# Patient Record
Sex: MALE | Race: WHITE | NOT HISPANIC OR LATINO | URBAN - METROPOLITAN AREA
[De-identification: names, ages, dates, MRNs, and addresses within clinical notes are randomized per-mention and may not be internally consistent; named-entity substitution may affect disease eponyms.]

---

## 2021-04-16 ENCOUNTER — OFFICE VISIT CONVERTED (OUTPATIENT)
Dept: FAMILY MEDICINE CLINIC | Facility: CLINIC | Age: 31
End: 2021-04-16
Attending: FAMILY MEDICINE

## 2021-04-30 ENCOUNTER — CONVERSION ENCOUNTER (OUTPATIENT)
Dept: FAMILY MEDICINE CLINIC | Facility: CLINIC | Age: 31
End: 2021-04-30

## 2021-04-30 ENCOUNTER — OFFICE VISIT CONVERTED (OUTPATIENT)
Dept: FAMILY MEDICINE CLINIC | Facility: CLINIC | Age: 31
End: 2021-04-30
Attending: FAMILY MEDICINE

## 2021-04-30 ENCOUNTER — HOSPITAL ENCOUNTER (OUTPATIENT)
Dept: FAMILY MEDICINE CLINIC | Facility: CLINIC | Age: 31
Discharge: HOME | End: 2021-04-30
Attending: FAMILY MEDICINE

## 2021-04-30 LAB
CHOLEST SERPL-MCNC: 201 MG/DL (ref 107–200)
CHOLEST/HDLC SERPL: 5.3 {RATIO} (ref 3–6)
HDLC SERPL-MCNC: 38 MG/DL (ref 40–60)
LDLC SERPL CALC-MCNC: 134 MG/DL (ref 70–100)
SARS-COV-2 RNA SPEC QL NAA+PROBE: NOT DETECTED
T4 FREE SERPL-MCNC: 0.9 NG/DL (ref 0.9–1.8)
TRIGL SERPL-MCNC: 144 MG/DL (ref 40–150)
TSH SERPL-ACNC: 0.81 M[IU]/L (ref 0.27–4.2)
VLDLC SERPL-MCNC: 29 MG/DL (ref 5–37)

## 2021-05-09 VITALS — OXYGEN SATURATION: 98 % | TEMPERATURE: 96.6 F | HEART RATE: 88 BPM

## 2021-05-09 VITALS
HEIGHT: 68 IN | WEIGHT: 197 LBS | TEMPERATURE: 97.5 F | SYSTOLIC BLOOD PRESSURE: 120 MMHG | HEART RATE: 83 BPM | DIASTOLIC BLOOD PRESSURE: 80 MMHG | OXYGEN SATURATION: 95 % | BODY MASS INDEX: 29.86 KG/M2

## 2021-08-30 ENCOUNTER — OFFICE VISIT (OUTPATIENT)
Dept: FAMILY MEDICINE CLINIC | Facility: CLINIC | Age: 31
End: 2021-08-30

## 2021-08-30 VITALS — HEART RATE: 78 BPM | TEMPERATURE: 97.7 F | OXYGEN SATURATION: 94 %

## 2021-08-30 DIAGNOSIS — R30.0 DYSURIA: ICD-10-CM

## 2021-08-30 DIAGNOSIS — R05.9 COUGH: Primary | ICD-10-CM

## 2021-08-30 DIAGNOSIS — R50.9 FEVER, UNSPECIFIED FEVER CAUSE: ICD-10-CM

## 2021-08-30 DIAGNOSIS — R10.9 FLANK PAIN: ICD-10-CM

## 2021-08-30 PROBLEM — F31.9 BIPOLAR DISORDER (HCC): Status: ACTIVE | Noted: 2021-08-30

## 2021-08-30 LAB
BILIRUB BLD-MCNC: NEGATIVE MG/DL
CLARITY, POC: CLEAR
COLOR UR: YELLOW
GLUCOSE UR STRIP-MCNC: NEGATIVE MG/DL
KETONES UR QL: NEGATIVE
LEUKOCYTE EST, POC: NEGATIVE
NITRITE UR-MCNC: NEGATIVE MG/ML
PH UR: 7.5 [PH] (ref 5–8)
PROT UR STRIP-MCNC: NEGATIVE MG/DL
RBC # UR STRIP: NEGATIVE /UL
SP GR UR: 1.01 (ref 1–1.03)
UROBILINOGEN UR QL: NORMAL

## 2021-08-30 PROCEDURE — 99213 OFFICE O/P EST LOW 20 MIN: CPT | Performed by: FAMILY MEDICINE

## 2021-08-30 PROCEDURE — U0003 INFECTIOUS AGENT DETECTION BY NUCLEIC ACID (DNA OR RNA); SEVERE ACUTE RESPIRATORY SYNDROME CORONAVIRUS 2 (SARS-COV-2) (CORONAVIRUS DISEASE [COVID-19]), AMPLIFIED PROBE TECHNIQUE, MAKING USE OF HIGH THROUGHPUT TECHNOLOGIES AS DESCRIBED BY CMS-2020-01-R: HCPCS | Performed by: FAMILY MEDICINE

## 2021-08-30 NOTE — PROGRESS NOTES
Chief Complaint    Fever (chills and nausea, started Saturday), Cough, and Headache    Subjective      Darci Kumar presents to [facility]     History of Present Illness    1.) ACUTE ILLNESS : Onset - 8.28.21. Patient reports subjective fevers, chills, nausea and a cough. He also reports intermittent headaches. He admits to bilateral flank pain - new onset - admits to discomfort with urination.    Objective      Vital Signs:     Pulse 78   Temp 97.7 °F (36.5 °C)   SpO2 94%       Physical Exam  Vitals reviewed.   Constitutional:       General: He is not in acute distress.     Appearance: Normal appearance. He is well-developed.   HENT:      Head: Normocephalic and atraumatic.      Right Ear: Hearing, tympanic membrane and external ear normal.      Left Ear: Hearing, tympanic membrane and external ear normal.      Nose: Nose normal.   Eyes:      General: Lids are normal.         Right eye: No discharge.         Left eye: No discharge.      Conjunctiva/sclera: Conjunctivae normal.   Cardiovascular:      Rate and Rhythm: Normal rate and regular rhythm.   Pulmonary:      Effort: Pulmonary effort is normal. No respiratory distress.      Breath sounds: No stridor. No wheezing, rhonchi or rales.   Abdominal:      General: There is no distension.   Musculoskeletal:         General: No swelling.      Cervical back: Neck supple.      Comments: Tender with palpation of bilateral flank    Skin:     Coloration: Skin is not jaundiced.      Findings: No erythema.   Neurological:      Mental Status: He is alert. Mental status is at baseline.   Psychiatric:         Mood and Affect: Mood and affect normal.         Thought Content: Thought content normal.     Assessment and Plan    Diagnoses and all orders for this visit:    1. Cough (Primary)  Comments:  1.) COVID testing as noted. Excused from work until COVID is resulted. Adequate fluids and rest.  Orders:  -      COVID-19,CEPHEID/KARI/BDMAX,COR/JEAN PAUL/PAD/OLYA IN-HOUSE(OR EMERGENT/ADD-ON),NP SWAB IN TRANSPORT MEDIA 3-4 HR TAT, RT-PCR - Swab, Nasopharynx    2. Fever, unspecified fever cause  Comments:  1.) Acetaminophen PRN.  Orders:  -     COVID-19,CEPHEID/KARI/BDMAX,COR/JEAN PAUL/PAD/OLYA IN-HOUSE(OR EMERGENT/ADD-ON),NP SWAB IN TRANSPORT MEDIA 3-4 HR TAT, RT-PCR - Swab, Nasopharynx    3. Flank pain  Comments:  1.) Patient was unable to provide urine - provided with specimen cup - will return. Will enter order at that time.  Orders:  -     Cancel: POCT urinalysis dipstick, automated  -     Cancel: Urine Culture - Urine, Urine, Clean Catch    4. Dysuria  Comments:  1.) See above.  Orders:  -     Cancel: POCT urinalysis dipstick, automated  -     Cancel: Urine Culture - Urine, Urine, Clean Catch    Follow Up     Return if symptoms worsen or fail to improve.     Patient was given instructions and counseling regarding his condition or for health maintenance advice. Please see specific information pulled into the AVS if appropriate.

## 2021-09-01 LAB — SARS-COV-2 RNA RESP QL NAA+PROBE: NOT DETECTED

## 2022-05-24 RX ORDER — CARIPRAZINE 3 MG/1
CAPSULE, GELATIN COATED ORAL
Qty: 90 CAPSULE | OUTPATIENT
Start: 2022-05-24

## 2022-06-18 ENCOUNTER — HOSPITAL ENCOUNTER (OUTPATIENT)
Facility: HOSPITAL | Age: 32
Setting detail: OBSERVATION
Discharge: HOME | End: 2022-06-21
Attending: EMERGENCY MEDICINE | Admitting: FAMILY MEDICINE

## 2022-06-18 ENCOUNTER — APPOINTMENT (OUTPATIENT)
Dept: GENERAL RADIOLOGY | Facility: HOSPITAL | Age: 32
End: 2022-06-18

## 2022-06-18 DIAGNOSIS — L03.90 MRSA CELLULITIS: ICD-10-CM

## 2022-06-18 DIAGNOSIS — F19.10 IV DRUG ABUSE: ICD-10-CM

## 2022-06-18 DIAGNOSIS — Z78.9 DECREASED ACTIVITIES OF DAILY LIVING (ADL): ICD-10-CM

## 2022-06-18 DIAGNOSIS — B95.62 MRSA CELLULITIS: ICD-10-CM

## 2022-06-18 DIAGNOSIS — R26.2 DIFFICULTY IN WALKING: ICD-10-CM

## 2022-06-18 DIAGNOSIS — L03.116 CELLULITIS AND ABSCESS OF LEFT LEG: ICD-10-CM

## 2022-06-18 DIAGNOSIS — L02.416 CELLULITIS AND ABSCESS OF LEFT LEG: ICD-10-CM

## 2022-06-18 DIAGNOSIS — A41.9 SEPSIS WITHOUT ACUTE ORGAN DYSFUNCTION, DUE TO UNSPECIFIED ORGANISM: Primary | ICD-10-CM

## 2022-06-18 LAB
ALBUMIN SERPL-MCNC: 3.9 G/DL (ref 3.5–5.2)
ALBUMIN/GLOB SERPL: 1.3 G/DL
ALP SERPL-CCNC: 87 U/L (ref 39–117)
ALT SERPL W P-5'-P-CCNC: 15 U/L (ref 1–41)
ANION GAP SERPL CALCULATED.3IONS-SCNC: 14.1 MMOL/L (ref 5–15)
AST SERPL-CCNC: 18 U/L (ref 1–40)
BASOPHILS # BLD AUTO: 0.06 10*3/MM3 (ref 0–0.2)
BASOPHILS NFR BLD AUTO: 0.3 % (ref 0–1.5)
BILIRUB SERPL-MCNC: 0.3 MG/DL (ref 0–1.2)
BUN SERPL-MCNC: 8 MG/DL (ref 6–20)
BUN/CREAT SERPL: 8 (ref 7–25)
CALCIUM SPEC-SCNC: 9 MG/DL (ref 8.6–10.5)
CHLORIDE SERPL-SCNC: 101 MMOL/L (ref 98–107)
CO2 SERPL-SCNC: 21.9 MMOL/L (ref 22–29)
CREAT SERPL-MCNC: 1 MG/DL (ref 0.76–1.27)
D-LACTATE SERPL-SCNC: 1.4 MMOL/L (ref 0.5–2)
D-LACTATE SERPL-SCNC: 3.4 MMOL/L (ref 0.5–2)
DEPRECATED RDW RBC AUTO: 41.8 FL (ref 37–54)
EGFRCR SERPLBLD CKD-EPI 2021: 101.9 ML/MIN/1.73
EOSINOPHIL # BLD AUTO: 0.1 10*3/MM3 (ref 0–0.4)
EOSINOPHIL NFR BLD AUTO: 0.6 % (ref 0.3–6.2)
ERYTHROCYTE [DISTWIDTH] IN BLOOD BY AUTOMATED COUNT: 11.9 % (ref 12.3–15.4)
GLOBULIN UR ELPH-MCNC: 2.9 GM/DL
GLUCOSE SERPL-MCNC: 82 MG/DL (ref 65–99)
HCT VFR BLD AUTO: 37.1 % (ref 37.5–51)
HGB BLD-MCNC: 12 G/DL (ref 13–17.7)
HOLD SPECIMEN: NORMAL
HOLD SPECIMEN: NORMAL
IMM GRANULOCYTES # BLD AUTO: 0.06 10*3/MM3 (ref 0–0.05)
IMM GRANULOCYTES NFR BLD AUTO: 0.3 % (ref 0–0.5)
LYMPHOCYTES # BLD AUTO: 1.61 10*3/MM3 (ref 0.7–3.1)
LYMPHOCYTES NFR BLD AUTO: 9.1 % (ref 19.6–45.3)
MAGNESIUM SERPL-MCNC: 2 MG/DL (ref 1.6–2.6)
MCH RBC QN AUTO: 30.7 PG (ref 26.6–33)
MCHC RBC AUTO-ENTMCNC: 32.3 G/DL (ref 31.5–35.7)
MCV RBC AUTO: 94.9 FL (ref 79–97)
MONOCYTES # BLD AUTO: 1.22 10*3/MM3 (ref 0.1–0.9)
MONOCYTES NFR BLD AUTO: 6.9 % (ref 5–12)
NEUTROPHILS NFR BLD AUTO: 14.7 10*3/MM3 (ref 1.7–7)
NEUTROPHILS NFR BLD AUTO: 82.8 % (ref 42.7–76)
NRBC BLD AUTO-RTO: 0 /100 WBC (ref 0–0.2)
PHOSPHATE SERPL-MCNC: 2.5 MG/DL (ref 2.5–4.5)
PLATELET # BLD AUTO: 141 10*3/MM3 (ref 140–450)
PMV BLD AUTO: 12.6 FL (ref 6–12)
POTASSIUM SERPL-SCNC: 3.8 MMOL/L (ref 3.5–5.2)
PROT SERPL-MCNC: 6.8 G/DL (ref 6–8.5)
RBC # BLD AUTO: 3.91 10*6/MM3 (ref 4.14–5.8)
SODIUM SERPL-SCNC: 137 MMOL/L (ref 136–145)
WBC NRBC COR # BLD: 17.75 10*3/MM3 (ref 3.4–10.8)
WHOLE BLOOD HOLD COAG: NORMAL
WHOLE BLOOD HOLD SPECIMEN: NORMAL

## 2022-06-18 PROCEDURE — 83735 ASSAY OF MAGNESIUM: CPT | Performed by: HOSPITALIST

## 2022-06-18 PROCEDURE — 96365 THER/PROPH/DIAG IV INF INIT: CPT

## 2022-06-18 PROCEDURE — 96366 THER/PROPH/DIAG IV INF ADDON: CPT

## 2022-06-18 PROCEDURE — 73590 X-RAY EXAM OF LOWER LEG: CPT

## 2022-06-18 PROCEDURE — 80053 COMPREHEN METABOLIC PANEL: CPT | Performed by: EMERGENCY MEDICINE

## 2022-06-18 PROCEDURE — 84100 ASSAY OF PHOSPHORUS: CPT | Performed by: HOSPITALIST

## 2022-06-18 PROCEDURE — 25010000002 CEFEPIME PER 500 MG: Performed by: HOSPITALIST

## 2022-06-18 PROCEDURE — 87186 SC STD MICRODIL/AGAR DIL: CPT | Performed by: EMERGENCY MEDICINE

## 2022-06-18 PROCEDURE — 85025 COMPLETE CBC W/AUTO DIFF WBC: CPT | Performed by: EMERGENCY MEDICINE

## 2022-06-18 PROCEDURE — G0378 HOSPITAL OBSERVATION PER HR: HCPCS

## 2022-06-18 PROCEDURE — 36415 COLL VENOUS BLD VENIPUNCTURE: CPT | Performed by: EMERGENCY MEDICINE

## 2022-06-18 PROCEDURE — 83605 ASSAY OF LACTIC ACID: CPT | Performed by: EMERGENCY MEDICINE

## 2022-06-18 PROCEDURE — 87070 CULTURE OTHR SPECIMN AEROBIC: CPT | Performed by: EMERGENCY MEDICINE

## 2022-06-18 PROCEDURE — 87641 MR-STAPH DNA AMP PROBE: CPT | Performed by: HOSPITALIST

## 2022-06-18 PROCEDURE — 25010000002 KETOROLAC TROMETHAMINE PER 15 MG: Performed by: EMERGENCY MEDICINE

## 2022-06-18 PROCEDURE — 96375 TX/PRO/DX INJ NEW DRUG ADDON: CPT

## 2022-06-18 PROCEDURE — 87205 SMEAR GRAM STAIN: CPT | Performed by: EMERGENCY MEDICINE

## 2022-06-18 PROCEDURE — 87040 BLOOD CULTURE FOR BACTERIA: CPT | Performed by: EMERGENCY MEDICINE

## 2022-06-18 PROCEDURE — 25010000002 VANCOMYCIN 5 G RECONSTITUTED SOLUTION: Performed by: EMERGENCY MEDICINE

## 2022-06-18 PROCEDURE — G0432 EIA HIV-1/HIV-2 SCREEN: HCPCS | Performed by: PHYSICIAN ASSISTANT

## 2022-06-18 PROCEDURE — 96367 TX/PROPH/DG ADDL SEQ IV INF: CPT

## 2022-06-18 PROCEDURE — 87147 CULTURE TYPE IMMUNOLOGIC: CPT | Performed by: EMERGENCY MEDICINE

## 2022-06-18 PROCEDURE — 99220 PR INITIAL OBSERVATION CARE/DAY 70 MINUTES: CPT | Performed by: HOSPITALIST

## 2022-06-18 PROCEDURE — 99284 EMERGENCY DEPT VISIT MOD MDM: CPT

## 2022-06-18 RX ORDER — ALUMINA, MAGNESIA, AND SIMETHICONE 2400; 2400; 240 MG/30ML; MG/30ML; MG/30ML
15 SUSPENSION ORAL EVERY 6 HOURS PRN
Status: DISCONTINUED | OUTPATIENT
Start: 2022-06-18 | End: 2022-06-21 | Stop reason: HOSPADM

## 2022-06-18 RX ORDER — POLYETHYLENE GLYCOL 3350 17 G/17G
17 POWDER, FOR SOLUTION ORAL DAILY PRN
Status: DISCONTINUED | OUTPATIENT
Start: 2022-06-18 | End: 2022-06-21 | Stop reason: HOSPADM

## 2022-06-18 RX ORDER — SODIUM CHLORIDE 0.9 % (FLUSH) 0.9 %
10 SYRINGE (ML) INJECTION EVERY 12 HOURS SCHEDULED
Status: DISCONTINUED | OUTPATIENT
Start: 2022-06-18 | End: 2022-06-21 | Stop reason: HOSPADM

## 2022-06-18 RX ORDER — CARIPRAZINE 3 MG/1
3 CAPSULE, GELATIN COATED ORAL DAILY
COMMUNITY
Start: 2022-04-13

## 2022-06-18 RX ORDER — ACETAMINOPHEN 160 MG/5ML
650 SOLUTION ORAL EVERY 4 HOURS PRN
Status: DISCONTINUED | OUTPATIENT
Start: 2022-06-18 | End: 2022-06-21 | Stop reason: HOSPADM

## 2022-06-18 RX ORDER — ACETAMINOPHEN 650 MG/1
650 SUPPOSITORY RECTAL EVERY 4 HOURS PRN
Status: DISCONTINUED | OUTPATIENT
Start: 2022-06-18 | End: 2022-06-21 | Stop reason: HOSPADM

## 2022-06-18 RX ORDER — CHOLECALCIFEROL (VITAMIN D3) 125 MCG
5 CAPSULE ORAL NIGHTLY PRN
Status: DISCONTINUED | OUTPATIENT
Start: 2022-06-18 | End: 2022-06-21 | Stop reason: HOSPADM

## 2022-06-18 RX ORDER — ONDANSETRON 4 MG/1
4 TABLET, FILM COATED ORAL EVERY 6 HOURS PRN
Status: DISCONTINUED | OUTPATIENT
Start: 2022-06-18 | End: 2022-06-21 | Stop reason: HOSPADM

## 2022-06-18 RX ORDER — ENOXAPARIN SODIUM 100 MG/ML
40 INJECTION SUBCUTANEOUS DAILY
Status: DISCONTINUED | OUTPATIENT
Start: 2022-06-19 | End: 2022-06-21 | Stop reason: HOSPADM

## 2022-06-18 RX ORDER — BISACODYL 5 MG/1
5 TABLET, DELAYED RELEASE ORAL DAILY PRN
Status: DISCONTINUED | OUTPATIENT
Start: 2022-06-18 | End: 2022-06-21 | Stop reason: HOSPADM

## 2022-06-18 RX ORDER — KETOROLAC TROMETHAMINE 30 MG/ML
30 INJECTION, SOLUTION INTRAMUSCULAR; INTRAVENOUS ONCE
Status: COMPLETED | OUTPATIENT
Start: 2022-06-18 | End: 2022-06-18

## 2022-06-18 RX ORDER — ONDANSETRON 2 MG/ML
4 INJECTION INTRAMUSCULAR; INTRAVENOUS EVERY 6 HOURS PRN
Status: DISCONTINUED | OUTPATIENT
Start: 2022-06-18 | End: 2022-06-21 | Stop reason: HOSPADM

## 2022-06-18 RX ORDER — BISACODYL 10 MG
10 SUPPOSITORY, RECTAL RECTAL DAILY PRN
Status: DISCONTINUED | OUTPATIENT
Start: 2022-06-18 | End: 2022-06-21 | Stop reason: HOSPADM

## 2022-06-18 RX ORDER — HYDROCODONE BITARTRATE AND ACETAMINOPHEN 5; 325 MG/1; MG/1
1 TABLET ORAL EVERY 4 HOURS PRN
Status: DISCONTINUED | OUTPATIENT
Start: 2022-06-18 | End: 2022-06-21 | Stop reason: HOSPADM

## 2022-06-18 RX ORDER — LORAZEPAM 0.5 MG/1
0.5 TABLET ORAL EVERY 6 HOURS PRN
Status: DISCONTINUED | OUTPATIENT
Start: 2022-06-18 | End: 2022-06-21 | Stop reason: HOSPADM

## 2022-06-18 RX ORDER — AMOXICILLIN 250 MG
2 CAPSULE ORAL 2 TIMES DAILY
Status: DISCONTINUED | OUTPATIENT
Start: 2022-06-18 | End: 2022-06-21 | Stop reason: HOSPADM

## 2022-06-18 RX ORDER — KETOROLAC TROMETHAMINE 30 MG/ML
30 INJECTION, SOLUTION INTRAMUSCULAR; INTRAVENOUS EVERY 6 HOURS PRN
Status: DISPENSED | OUTPATIENT
Start: 2022-06-18 | End: 2022-06-20

## 2022-06-18 RX ORDER — NITROGLYCERIN 0.4 MG/1
0.4 TABLET SUBLINGUAL
Status: DISCONTINUED | OUTPATIENT
Start: 2022-06-18 | End: 2022-06-21 | Stop reason: HOSPADM

## 2022-06-18 RX ORDER — SODIUM CHLORIDE 0.9 % (FLUSH) 0.9 %
10 SYRINGE (ML) INJECTION AS NEEDED
Status: DISCONTINUED | OUTPATIENT
Start: 2022-06-18 | End: 2022-06-21 | Stop reason: HOSPADM

## 2022-06-18 RX ORDER — SODIUM CHLORIDE 9 MG/ML
100 INJECTION, SOLUTION INTRAVENOUS CONTINUOUS
Status: DISCONTINUED | OUTPATIENT
Start: 2022-06-18 | End: 2022-06-21 | Stop reason: HOSPADM

## 2022-06-18 RX ORDER — LIDOCAINE HYDROCHLORIDE AND EPINEPHRINE 10; 10 MG/ML; UG/ML
10 INJECTION, SOLUTION INFILTRATION; PERINEURAL ONCE
Status: COMPLETED | OUTPATIENT
Start: 2022-06-18 | End: 2022-06-18

## 2022-06-18 RX ORDER — ACETAMINOPHEN 325 MG/1
650 TABLET ORAL EVERY 4 HOURS PRN
Status: DISCONTINUED | OUTPATIENT
Start: 2022-06-18 | End: 2022-06-21 | Stop reason: HOSPADM

## 2022-06-18 RX ADMIN — HYDROCODONE BITARTRATE AND ACETAMINOPHEN 1 TABLET: 5; 325 TABLET ORAL at 23:28

## 2022-06-18 RX ADMIN — CEFEPIME HYDROCHLORIDE 1 G: 1 INJECTION, POWDER, FOR SOLUTION INTRAMUSCULAR; INTRAVENOUS at 23:04

## 2022-06-18 RX ADMIN — Medication 5 MG: at 22:56

## 2022-06-18 RX ADMIN — SODIUM CHLORIDE 1000 ML: 9 INJECTION, SOLUTION INTRAVENOUS at 23:29

## 2022-06-18 RX ADMIN — VANCOMYCIN HYDROCHLORIDE 1750 MG: 5 INJECTION, POWDER, LYOPHILIZED, FOR SOLUTION INTRAVENOUS at 19:53

## 2022-06-18 RX ADMIN — KETOROLAC TROMETHAMINE 30 MG: 30 INJECTION, SOLUTION INTRAMUSCULAR; INTRAVENOUS at 19:53

## 2022-06-18 RX ADMIN — LIDOCAINE HYDROCHLORIDE,EPINEPHRINE BITARTRATE 10 ML: 10; .01 INJECTION, SOLUTION INFILTRATION; PERINEURAL at 20:23

## 2022-06-18 NOTE — ED PROVIDER NOTES
Time: 6:42 PM EDT  Arrived by: private car  Chief Complaint: Wound Infection  History provided by: Patient  History is limited by: N/A     History of Present Illness:  Patient is a 31 y.o. year old male who presents to the emergency department with a chief complaint of a wound infection.  Pt. States he went to [facility] a few nights ago, they said it was cellulitis on the lower left leg, but it has gotten worse. Pt. States he has not been diagnosed with mrsa in the past. Pt. States it has been red since two days ago. Pt. States he has used Keflex and Bactrim with minimal releif. Pt. States that he has had a fever and chills. Pt. Denies diabetes. Pt. confirms he uses methamphetamine.             Similar Symptoms Previously: No  Recently seen: No      Patient Care Team  Primary Care Provider: Eliazar Maier MD    Past Medical History:     No Known Allergies  Past Medical History:   Diagnosis Date   • Bipolar disorder (HCC)      History reviewed. No pertinent surgical history.  History reviewed. No pertinent family history.    Home Medications:  Prior to Admission medications    Not on File        Social History:   Social History     Tobacco Use   • Smoking status: Current Every Day Smoker     Packs/day: 1.50   • Smokeless tobacco: Never Used   Vaping Use   • Vaping Use: Unknown   Substance Use Topics   • Alcohol use: Not Currently   • Drug use: Not Currently     Types: IV     Recent travel: not applicable     Review of Systems:  Review of Systems   Constitutional: Positive for chills and fever.   HENT: Negative for congestion, ear pain and sore throat.    Eyes: Negative for pain.   Respiratory: Negative for cough, chest tightness and shortness of breath.    Cardiovascular: Negative for chest pain.   Gastrointestinal: Negative for abdominal pain, diarrhea, nausea and vomiting.   Genitourinary: Negative for flank pain and hematuria.   Musculoskeletal: Negative for joint swelling.   Skin: Positive  "for wound. Negative for pallor.        Left leg ceullulitis   Neurological: Negative for seizures and headaches.   All other systems reviewed and are negative.       Physical Exam:  /75   Pulse 105   Temp 99.1 °F (37.3 °C) (Oral)   Resp 18   Ht 177.8 cm (70\")   Wt 85.5 kg (188 lb 7.9 oz)   SpO2 98%   BMI 27.05 kg/m²     Physical Exam  Vitals and nursing note reviewed.   Constitutional:       General: He is in acute distress.      Appearance: Normal appearance. He is not toxic-appearing.   HENT:      Head: Normocephalic and atraumatic.      Mouth/Throat:      Mouth: Mucous membranes are moist.   Eyes:      General: No scleral icterus.  Cardiovascular:      Rate and Rhythm: Regular rhythm. Tachycardia present.      Pulses: Normal pulses.      Heart sounds: Normal heart sounds.      Comments: Good left DP pulses.   Pulmonary:      Effort: Pulmonary effort is normal. No respiratory distress.      Breath sounds: Normal breath sounds.   Abdominal:      General: Abdomen is flat.      Palpations: Abdomen is soft.      Tenderness: There is no abdominal tenderness.   Musculoskeletal:      Cervical back: Normal range of motion and neck supple.      Comments:  Anterior left lower leg: raised area of swelling with 1cm scab in center that is tender. No drainage, not edematous.     Left Wrist: 1 cm area of erythema and small scab over ulnar aspect. No drainage   Skin:     General: Skin is warm and dry.   Neurological:      Mental Status: He is alert and oriented to person, place, and time. Mental status is at baseline.                Medications in the Emergency Department:  Medications   sodium chloride 0.9 % bolus 1,000 mL (has no administration in time range)   sodium chloride 0.9 % bolus 1,000 mL (has no administration in time range)   nitroglycerin (NITROSTAT) SL tablet 0.4 mg (has no administration in time range)   sodium chloride 0.9 % flush 10 mL (has no administration in time range)   sodium chloride 0.9 % " flush 10 mL (has no administration in time range)   Enoxaparin Sodium (LOVENOX) syringe 40 mg (has no administration in time range)   sodium chloride 0.9 % infusion (has no administration in time range)   Pharmacy to dose vancomycin (has no administration in time range)   Pharmacy to Dose Cefepime (has no administration in time range)   HYDROcodone-acetaminophen (NORCO) 5-325 MG per tablet 1 tablet (has no administration in time range)   acetaminophen (TYLENOL) tablet 650 mg (has no administration in time range)     Or   acetaminophen (TYLENOL) 160 MG/5ML solution 650 mg (has no administration in time range)     Or   acetaminophen (TYLENOL) suppository 650 mg (has no administration in time range)   ketorolac (TORADOL) injection 30 mg (has no administration in time range)   melatonin tablet 5 mg (has no administration in time range)   LORazepam (ATIVAN) tablet 0.5 mg (has no administration in time range)   ondansetron (ZOFRAN) tablet 4 mg (has no administration in time range)     Or   ondansetron (ZOFRAN) injection 4 mg (has no administration in time range)   sennosides-docusate (PERICOLACE) 8.6-50 MG per tablet 2 tablet (has no administration in time range)     And   polyethylene glycol (MIRALAX) packet 17 g (has no administration in time range)     And   bisacodyl (DULCOLAX) EC tablet 5 mg (has no administration in time range)     And   bisacodyl (DULCOLAX) suppository 10 mg (has no administration in time range)   aluminum-magnesium hydroxide-simethicone (MAALOX MAX) 400-400-40 MG/5ML suspension 15 mL (has no administration in time range)   cefepime (MAXIPIME) 1 g/100 mL 0.9% NS IVPB (mbp) (has no administration in time range)   cefepime (MAXIPIME) 1 g/100 mL 0.9% NS IVPB (mbp) (has no administration in time range)   vancomycin 1750 mg/500 mL 0.9% NS IVPB ([facility]) (1,750 mg Intravenous New Bag 6/18/22 1953)   ketorolac (TORADOL) injection 30 mg (30 mg Intravenous Given 6/18/22 1953)   lidocaine 1% - EPINEPHrine  1:049608 (XYLOCAINE W/EPI) 1 %-1:978818 injection 10 mL (10 mL Injection Given by Other 6/18/22 2023)        Labs  Lab Results (last 24 hours)     Procedure Component Value Units Date/Time    Blood Culture - Blood, Hand, Right [[id number]] Collected: 06/18/22 1635    Specimen: Blood from Hand, Right Updated: 06/18/22 1656    Wound Culture - Wound, Leg, Left [[id number]] Collected: 06/18/22 1635    Specimen: Wound from Leg, Left Updated: 06/18/22 1827     Gram Stain Few (2+) Gram positive cocci in pairs, chains and clusters    CBC & Differential [[id number]]  (Abnormal) Collected: 06/18/22 1635    Specimen: Blood Updated: 06/18/22 1856    Narrative:      The following orders were created for panel order CBC & Differential.  Procedure                               Abnormality         Status                     ---------                               -----------         ------                     CBC Auto Differential[[id number]]        Abnormal            Final result                 Please view results for these tests on the individual orders.    Comprehensive Metabolic Panel [[id number]]  (Abnormal) Collected: 06/18/22 1635    Specimen: Blood Updated: 06/18/22 1906     Glucose 82 mg/dL      BUN 8 mg/dL      Creatinine 1.00 mg/dL      Sodium 137 mmol/L      Potassium 3.8 mmol/L      Chloride 101 mmol/L      CO2 21.9 mmol/L      Calcium 9.0 mg/dL      Total Protein 6.8 g/dL      Albumin 3.90 g/dL      ALT (SGPT) 15 U/L      AST (SGOT) 18 U/L      Alkaline Phosphatase 87 U/L      Total Bilirubin 0.3 mg/dL      Globulin 2.9 gm/dL      A/G Ratio 1.3 g/dL      BUN/Creatinine Ratio 8.0     Anion Gap 14.1 mmol/L      eGFR 101.9 mL/min/1.73      Comment: National Kidney Foundation and American Society of Nephrology (ASN) Task Force recommended calculation based on the Chronic Kidney Disease Epidemiology Collaboration (CKD-EPI) equation refit without adjustment for race.       Narrative:      GFR Normal >60  Chronic Kidney Disease  <60  Kidney Failure <15      CBC Auto Differential [[id number]]  (Abnormal) Collected: 06/18/22 1635    Specimen: Blood Updated: 06/18/22 1856     WBC 17.75 10*3/mm3      RBC 3.91 10*6/mm3      Hemoglobin 12.0 g/dL      Hematocrit 37.1 %      MCV 94.9 fL      MCH 30.7 pg      MCHC 32.3 g/dL      RDW 11.9 %      RDW-SD 41.8 fl      MPV 12.6 fL      Platelets 141 10*3/mm3      Neutrophil % 82.8 %      Lymphocyte % 9.1 %      Monocyte % 6.9 %      Eosinophil % 0.6 %      Basophil % 0.3 %      Immature Grans % 0.3 %      Neutrophils, Absolute 14.70 10*3/mm3      Lymphocytes, Absolute 1.61 10*3/mm3      Monocytes, Absolute 1.22 10*3/mm3      Eosinophils, Absolute 0.10 10*3/mm3      Basophils, Absolute 0.06 10*3/mm3      Immature Grans, Absolute 0.06 10*3/mm3      nRBC 0.0 /100 WBC     Lactic Acid, Plasma [[id number]]  (Abnormal) Collected: 06/18/22 1636    Specimen: Blood Updated: 06/18/22 1729     Lactate 3.4 mmol/L     STAT Lactic Acid, Reflex [[id number]]  (Normal) Collected: 06/18/22 1943    Specimen: Blood Updated: 06/18/22 2006     Lactate 1.4 mmol/L            Imaging:  XR Tibia Fibula 2 View Left    Result Date: 6/18/2022  PROCEDURE: XR TIBIA FIBULA 2 VW LEFT  COMPARISON: None.  INDICATIONS: infection; eval FB vs soft tissue gas in anterior lower leg  FINDINGS: Two views were obtained.  No acute fracture or acute malalignment is identified.  No subcutaneous emphysema.  No retained radiopaque foreign body.  Soft tissue swelling is seen.  No radiographic evidence of osteomyelitis.  If symptoms or clinical concerns persist, consider imaging follow-up.       No acute fracture or acute malalignment is identified.     COMMENT:  Part of this note is an electronic transcription of spoken language to printed text. The electronic translation/transcription may permit erroneous, or at times, nonsensical (or even sensical) words or phrases to be inadvertently transcribed or omitted; this  has reviewed the note for  "such errors (as well as additional errors); however, some may still exist.  SALOMÓN LOMELI JR, MD       Electronically Signed and Approved By: SALOMÓN LOMELI JR, MD on 6/18/2022 at 19:22                Procedures:  Incision & Drainage    Date/Time: 6/18/2022 8:26 PM  Performed by: Abraham Vega MD  Authorized by: Abraham Vega MD     Consent:     Consent obtained:  Verbal    Consent given by:  Patient    Risks, benefits, and alternatives were discussed: yes      Risks discussed:  Pain, infection and bleeding  Universal protocol:     Patient identity confirmed:  Verbally with patient  Location:     Type:  Abscess    Location:  Lower extremity    Lower extremity location:  Leg    Leg location:  L lower leg  Pre-procedure details:     Skin preparation:  Antiseptic wash  Sedation:     Sedation type:  None  Anesthesia:     Anesthesia method:  Local infiltration    Local anesthetic:  Lidocaine 1% WITH epi (2 mL)  Procedure type:     Complexity:  Simple  Procedure details:     Ultrasound guidance: yes      Incision types:  Single straight    Incision depth:  Subcutaneous    Wound management:  Probed and deloculated and irrigated with saline    Drainage:  Purulent    Drainage amount:  Copious    Wound treatment:  Wound left open    Packing materials:  1/4 in iodoform gauze    Amount 1/4\" iodoform:  4 cm  Post-procedure details:     Procedure completion:  Tolerated well, no immediate complications        Progress                            Medical Decision Making:  MDM  Number of Diagnoses or Management Options     Amount and/or Complexity of Data Reviewed  Clinical lab tests: reviewed  Tests in the radiology section of CPT®: reviewed    Critical Care  Total time providing critical care: 30-74 minutes (I spent greater than 35 minutes in critical care for this patient, excluding any time spent on any billable procedures, such as incision and drainage today.    I reviewed the blood work and vital signs including pulse " oximetry, cardiac output.    For patient's left leg cellulitis and meeting sepsis criteria with elevated heart rate, elevated white blood cell count and elevated lactic acid, I sent off blood cultures, started IV broad-spectrum antibiotics for skin and soft tissue infection, and aggressively hydrated the patient with multiple fluid boluses in the ED..    I reassessed the patient at the bedside and he looks somewhat improved, and I have consulted the admitting hospitalist physician to be involved in this patient's medical care.)           This patient is a pleasant 31-year-old male with some history of IV methamphetamine abuse recently now presents with worsening left lower leg cellulitis and abscess draining purulent discharge for the past few days despite taking 2 days of Bactrim DS after being seen at outside ER in [place] and given vancomycin.    On arrival he is tachycardic here but afebrile, but has elevated white count of 17 and lactic acid of 3.2, concerning for sepsis.    We did give him some IV fluids and sent off blood cultures and started IV vancomycin antibiotic for skin and soft tissue infection.    I performed bedside ultrasound revealing abscess in the anterior left lower leg which I performed incision and drainage and then after expressing a large amount of purulent drainage was able to irrigate and pack the wound with iodoform and dressed with sterile gauze dressing.    He will be admitted to the hospital for IV antibiotics for further management of left leg cellulitis and now drained abscess and sepsis.      Final diagnoses:   Sepsis without acute organ dysfunction, due to unspecified organism (HCC)   Cellulitis and abscess of left leg   IV drug abuse (HCC)        Disposition:  ED Disposition     ED Disposition   Decision to Admit    Condition   --    Comment   Level of Care: Telemetry [5]   Diagnosis: Sepsis (HCC) [[id number]]   Admitting Physician: PIERO WILSON [[id number]]   Attending Physician:  PIERO WILSON [[id number]]               This medical record created using voice recognition software.           Saqib Vu  06/18/22 1912       Abraham Vega MD  06/18/22 2029

## 2022-06-19 ENCOUNTER — APPOINTMENT (OUTPATIENT)
Dept: CARDIOLOGY | Facility: HOSPITAL | Age: 32
End: 2022-06-19

## 2022-06-19 LAB — MRSA DNA SPEC QL NAA+PROBE: ABNORMAL

## 2022-06-19 PROCEDURE — 25010000002 CEFEPIME PER 500 MG: Performed by: HOSPITALIST

## 2022-06-19 PROCEDURE — 25010000002 ENOXAPARIN PER 10 MG: Performed by: HOSPITALIST

## 2022-06-19 PROCEDURE — 25010000002 KETOROLAC TROMETHAMINE PER 15 MG: Performed by: HOSPITALIST

## 2022-06-19 PROCEDURE — 93306 TTE W/DOPPLER COMPLETE: CPT

## 2022-06-19 PROCEDURE — G0378 HOSPITAL OBSERVATION PER HR: HCPCS

## 2022-06-19 PROCEDURE — 97161 PT EVAL LOW COMPLEX 20 MIN: CPT

## 2022-06-19 PROCEDURE — 96372 THER/PROPH/DIAG INJ SC/IM: CPT

## 2022-06-19 PROCEDURE — 96376 TX/PRO/DX INJ SAME DRUG ADON: CPT

## 2022-06-19 PROCEDURE — 99226 PR SBSQ OBSERVATION CARE/DAY 35 MINUTES: CPT | Performed by: FAMILY MEDICINE

## 2022-06-19 PROCEDURE — 93306 TTE W/DOPPLER COMPLETE: CPT | Performed by: INTERNAL MEDICINE

## 2022-06-19 PROCEDURE — 25010000002 VANCOMYCIN 5 G RECONSTITUTED SOLUTION: Performed by: HOSPITALIST

## 2022-06-19 RX ORDER — NICOTINE 21 MG/24HR
1 PATCH, TRANSDERMAL 24 HOURS TRANSDERMAL
Status: DISCONTINUED | OUTPATIENT
Start: 2022-06-19 | End: 2022-06-21 | Stop reason: HOSPADM

## 2022-06-19 RX ADMIN — HYDROCODONE BITARTRATE AND ACETAMINOPHEN 1 TABLET: 5; 325 TABLET ORAL at 21:52

## 2022-06-19 RX ADMIN — CEFEPIME HYDROCHLORIDE 1 G: 1 INJECTION, POWDER, FOR SOLUTION INTRAMUSCULAR; INTRAVENOUS at 22:52

## 2022-06-19 RX ADMIN — NICOTINE 1 PATCH: 21 PATCH, EXTENDED RELEASE TRANSDERMAL at 08:58

## 2022-06-19 RX ADMIN — LORAZEPAM 0.5 MG: 0.5 TABLET ORAL at 22:52

## 2022-06-19 RX ADMIN — HYDROCODONE BITARTRATE AND ACETAMINOPHEN 1 TABLET: 5; 325 TABLET ORAL at 16:43

## 2022-06-19 RX ADMIN — SODIUM CHLORIDE 100 ML/HR: 9 INJECTION, SOLUTION INTRAVENOUS at 16:45

## 2022-06-19 RX ADMIN — ENOXAPARIN SODIUM 40 MG: 100 INJECTION SUBCUTANEOUS at 08:57

## 2022-06-19 RX ADMIN — VANCOMYCIN HYDROCHLORIDE 1250 MG: 5 INJECTION, POWDER, LYOPHILIZED, FOR SOLUTION INTRAVENOUS at 20:03

## 2022-06-19 RX ADMIN — Medication 10 ML: at 08:58

## 2022-06-19 RX ADMIN — HYDROCODONE BITARTRATE AND ACETAMINOPHEN 1 TABLET: 5; 325 TABLET ORAL at 11:32

## 2022-06-19 RX ADMIN — LORAZEPAM 0.5 MG: 0.5 TABLET ORAL at 16:44

## 2022-06-19 RX ADMIN — CEFEPIME HYDROCHLORIDE 1 G: 1 INJECTION, POWDER, FOR SOLUTION INTRAMUSCULAR; INTRAVENOUS at 13:15

## 2022-06-19 RX ADMIN — CEFEPIME HYDROCHLORIDE 1 G: 1 INJECTION, POWDER, FOR SOLUTION INTRAMUSCULAR; INTRAVENOUS at 04:11

## 2022-06-19 RX ADMIN — VANCOMYCIN HYDROCHLORIDE 1250 MG: 5 INJECTION, POWDER, LYOPHILIZED, FOR SOLUTION INTRAVENOUS at 08:00

## 2022-06-19 RX ADMIN — KETOROLAC TROMETHAMINE 30 MG: 30 INJECTION, SOLUTION INTRAMUSCULAR; INTRAVENOUS at 04:11

## 2022-06-19 NOTE — PROGRESS NOTES
[facility]   Hospitalist Progress Note  Date: 2022  Patient Name: Darci Kumar  : 1990  MRN: [id number]  Date of admission: 2022      Subjective   Subjective     Chief complaint: Wound infection    Summary:  31-year-old male with history of bipolar disorder, IV drug abuse, hospitalized on 2022 with left lower extremity cellulitis, failed outpatient therapy, hospitalized for further monitoring and management, detected MRSA from his wound culture, he is on vancomycin until we have sensitivities, bedside ultrasound showed abscess, status post I&D at bedside in the emergency room    Interval follow-up: Patient seen and examined this morning, no acute distress, no acute major night events, patient's left lower extremity still has significant redness and erythema, throbbing pain.  Still has fevers, chills.  Denies chest pain or palpitations.  Detected MRSA in his wound culture.  Blood culture still pending.    Review of systems:  All systems reviewed and negative except for left lower extremity pain, tactile fevers, chills, fatigue    Objective   Objective     Vitals:   Temp:  [97.3 °F (36.3 °C)-99.7 °F (37.6 °C)] 99 °F (37.2 °C)  Heart Rate:  [] 85  Resp:  [16-18] 16  BP: ()/(45-75) 95/45  Physical Exam    Constitutional: Awake, alert, no acute distress   Eyes: Pupils equal, sclerae anicteric, no conjunctival injection   HENT: NCAT, mucous membranes moist   Neck: Supple, no thyromegaly, no lymphadenopathy, trachea midline   Respiratory: Clear to auscultation bilaterally, nonlabored respirations    Cardiovascular: RRR, no murmurs, rubs, or gallops, palpable pedal pulses bilaterally   Gastrointestinal: Positive bowel sounds, soft, nontender, nondistended   Musculoskeletal: No bilateral ankle edema, no clubbing or cyanosis to extremities   Psychiatric: Appropriate affect, cooperative   Neurologic: Oriented x 3, strength symmetric in all extremities, Cranial Nerves grossly intact to  confrontation, speech clear   Skin: No rashes, left lower extremity with significant erythema, dressed, tender with swelling    Result Review    Result Review:  I have personally reviewed the results from the time of this admission to 6/19/2022 16:34 EDT and agree with these findings:  [x]  Laboratory   CBC    CBC 6/18/22   WBC 17.75 (A)   RBC 3.91 (A)   Hemoglobin 12.0 (A)   Hematocrit 37.1 (A)   MCV 94.9   MCH 30.7   MCHC 32.3   RDW 11.9 (A)   Platelets 141   (A) Abnormal value            BMP    BMP 6/18/22   BUN 8   Creatinine 1.00   Sodium 137   Potassium 3.8   Chloride 101   CO2 21.9 (A)   Calcium 9.0   (A) Abnormal value            LIVER FUNCTION TESTS:      Lab 06/18/22  1635   TOTAL PROTEIN 6.8   ALBUMIN 3.90   GLOBULIN 2.9   ALT (SGPT) 15   AST (SGOT) 18   BILIRUBIN 0.3   ALK PHOS 87       [x]  Microbiology No results found for: ACANTHNAEG, AFBCX, BPERTUSSISCX, BLOODCX  No results found for: BCIDPCR, CXREFLEX, CSFCX, CULTURETIS  No results found for: CULTURES, HSVCX, URCX  No results found for: EYECULTURE, GCCX, HSVCULTURE, LABHSV  No results found for: LEGIONELLA, MRSACX, MUMPSCX, MYCOPLASCX  No results found for: NOCARDIACX, STOOLCX  No results found for: THROATCX, UNSTIMCULT, URINECX, CULTURE, VZVCULTUR  Wound Culture   Date Value Ref Range Status   06/18/2022 Light growth (2+) Staphylococcus aureus, MRSA (A)  Preliminary     Comment:     Methicillin resistant Staphylococcus aureus, Patient may be an isolation risk.       [x]  Radiology XR Tibia Fibula 2 View Left    Result Date: 6/18/2022  PROCEDURE: XR TIBIA FIBULA 2 VW LEFT  COMPARISON: None.  INDICATIONS: infection; eval FB vs soft tissue gas in anterior lower leg  FINDINGS: Two views were obtained.  No acute fracture or acute malalignment is identified.  No subcutaneous emphysema.  No retained radiopaque foreign body.  Soft tissue swelling is seen.  No radiographic evidence of osteomyelitis.  If symptoms or clinical concerns persist, consider  imaging follow-up.       No acute fracture or acute malalignment is identified.     COMMENT:  Part of this note is an electronic transcription of spoken language to printed text. The electronic translation/transcription may permit erroneous, or at times, nonsensical (or even sensical) words or phrases to be inadvertently transcribed or omitted; this  has reviewed the note for such errors (as well as additional errors); however, some may still exist.  SALOMÓN LOMELI JR, MD       Electronically Signed and Approved By: SALOMÓN LOMELI JR, MD on 6/18/2022 at 19:22                []  EKG/Telemetry   []  Cardiology/Vascular   []  Pathology  [x]  Old records  []  Other:    Assessment & Plan   Assessment / Plan     Assessment/Plan:  Assessment:  Left lower extremity cellulitis with abscess status post I&D, infection secondary to MRSA  Bipolar disorder  IV drug abuse  Sepsis secondary to cellulitis  Lactic acidosis  Metabolic acidosis    Plan:  Labs and imaging reviewed  Continue vancomycin  Continue cefepime  Monitor dose vancomycin by AUC method  Follow-up wound cultures  If his white count does not improve or he does not have clinical signs of improvement, will need to further scan his leg for further presence of abscess  Pain control with p.o. opioids, IV Toradol, Tylenol.  A.m. labs and send full code  VTE prophylaxis with Lovenox  Clinical course to dictate further management  Discussed with nurse at the bedside    DVT prophylaxis:  Medical DVT prophylaxis orders are present.    CODE STATUS:   Level Of Support Discussed With: Patient  Code Status (Patient has no pulse and is not breathing): CPR (Attempt to Resuscitate)  Medical Interventions (Patient has pulse or is breathing): Full Support        Electronically signed by Alcides Harmon MD, 06/19/22, 4:34 PM EDT.

## 2022-06-19 NOTE — PLAN OF CARE
Goal Outcome Evaluation:  Plan of Care Reviewed With: patient        Progress: improving  Outcome Evaluation: Pt is at prior level of function therefore skilled PT intervention is not needed.  DC PT at this time.

## 2022-06-19 NOTE — PROGRESS NOTES
"Pharmacy to Dose Vancomycin Day: 2    Darci Kumar is a 31 y.o.male admitted with left lower leg cellulitis. Pharmacy has been consulted to dose IV Vancomycin     Consulting Provider: Dr. Siddiqui  Clinical Indication: bacteremia   Pertinent Past Medical History:   Red wound on lower left leg x 2 days. Prescribed keflex and bactrim from outside ER   IV drug use   Goal -600 mg/L.hr  Duration of therapy: 10 days     177.8 cm (70\")       06/18/22 2310      Weight: 86 kg (189 lb 9.5 oz)         Estimated Creatinine Clearance: 127.8 mL/min (by C-G formula based on SCr of 1 mg/dL).  Results from last 7 days  Lab  Units  06/18/22  1635  BUN  mg/dL  8  CREATININE  mg/dL  1.00    HD/PD/CRRT?: No    Lab Results   Component Value Date    WBC 17.75 (H) 06/18/2022      Temperature    06/18/22 2310 06/19/22 0440 06/19/22 0851   Temp: 99.7 °F (37.6 °C) 98.8 °F (37.1 °C) 97.3 °F (36.3 °C)     Contrast Administered: No    Relevant Micro:   Microbiology Results (last 10 days)       Procedure Component Value - Date/Time    MRSA Screen, PCR (Inpatient) - Swab, Nares [[id number]]  (Abnormal) Collected: 06/18/22 2310    Lab Status: Final result Specimen: Swab from Nares Updated: 06/19/22 0047     MRSA PCR MRSA Detected    Narrative:      The negative predictive value of this diagnostic test is high and should only be used to consider de-escalating anti-MRSA therapy. A positive result may indicate colonization with MRSA and must be correlated clinically.    Wound Culture - Wound, Leg, Left [[id number]] Collected: 06/18/22 2030    Lab Status: Preliminary result Specimen: Wound from Leg, Left Updated: 06/19/22 0314     Gram Stain No organisms seen    Wound Culture - Wound, Leg, Left [[id number]] Collected: 06/18/22 1635    Lab Status: Preliminary result Specimen: Wound from Leg, Left Updated: 06/18/22 1827     Gram Stain Few (2+) Gram positive cocci in pairs, chains and clusters           Relevant Radiology: XRAY L Tibia Fibula: No acute " fracture or acute malalignment is identified.    Other Antimicrobial Therapy: Cefepime  On bactrim and keflex PTA    Assessment/Plan  Regimen: 1250 mg IV every 12 hours.  Start time: 10:51 on 06/19/2022  Exposure target: AUC24 (range) 400-600 mg/L.hr   AUC24,ss: 532 mg/L.hr  PAUC*: 77 %  Ctrough,ss: 16.3 mg/L  Pconc*: 35 %  Tox.: 12 %    OK TO CONTINUE ON CURRENT DOSE.  VANCOMYCIN LEVEL ORDERED FOR TOMORROW AT 0600.

## 2022-06-19 NOTE — H&P
[facility] HISTORY AND PHYSICAL  Date: 2022   Patient Name: Darci Kumar  : 1990  MRN: [id number]  Primary Care Physician:  Eliazar Maier MD  Date of admission: 2022    Subjective Wound infection  Subjective     Chief Complaint: Wound infection    HPI: Patient is a 31-year-old male who presents emergency room with a wound infection.  He was seen at [facility] a few nights ago he said he had cellulitis on his left lower leg but it is gotten worse.  Patient has not been diagnosed with MRSA in the past.  He has been taking Keflex and Bactrim with minimal relief.  He has had fevers and chills.  He reports some chest pain and shortness of breath as well.  Patient confirms that he uses methamphetamine and IV drugs.  He states that he is going to quit.    On arrival to the ED, his temperature is 99.1, pulse 104, respirations 17, blood pressure 134/70, and he saturating 100% on room air.  Patient's initial lactate was 3.4.  Gram stain of his wound had gram-positive cocci in pairs, chains, clusters.  Patient's white blood cell count was 17.75.  Hemoglobin is 12.    X-ray of the tibia and fibula showed no acute fracture.  However, bedside ultrasound did show an abscess.  That was drained.  Personal History     Past Medical History:  Past Medical History:   Diagnosis Date   • Bipolar disorder (HCC)          Past Surgical History:  History reviewed. No pertinent surgical history.    Family History:   History reviewed. No pertinent family history.    Social History:   Social History     Tobacco Use   • Smoking status: Current Every Day Smoker     Packs/day: 1.50   • Smokeless tobacco: Never Used   Vaping Use   • Vaping Use: Unknown   Substance Use Topics   • Alcohol use: Not Currently   • Drug use: Not Currently     Types: IV       Home Medications:   Nightly home medications.    Allergies:  No Known Allergies    Review of Systems   All systems were reviewed and negative except  for: Shortness of breath, abscess on leg    Objective   Objective     Vitals:   Temp:  [99.1 °F (37.3 °C)] 99.1 °F (37.3 °C)  Heart Rate:  [] 92  Resp:  [17-18] 18  BP: (111-134)/(67-75) 111/67    Physical Exam    Constitutional: Awake, alert, no acute distress   Eyes: Pupils equal, sclerae anicteric, no conjunctival injection   HENT: NCAT, mucous membranes moist   Neck: Supple, no thyromegaly, no lymphadenopathy, trachea midline   Respiratory: Clear to auscultation bilaterally, nonlabored respirations    Cardiovascular: RRR, no murmurs, rubs, or gallops, palpable pedal pulses bilaterally   Gastrointestinal: Positive bowel sounds, soft, nontender, nondistended   Musculoskeletal: Left lower leg abscess and cellulitis   Psychiatric: Appropriate affect, cooperative   Neurologic: Oriented x 3, strength symmetric in all extremities, Cranial Nerves grossly intact to confrontation, speech clear   Skin: No rashes     Result Review    Result Review:  I have personally reviewed the results from the time of this admission to 6/18/2022 21:10 EDT and agree with these findings:  [x]  Laboratory  [x]  Microbiology  []  Radiology  []  EKG/Telemetry   []  Cardiology/Vascular   []  Pathology  [x]  Old records  []  Other:      Assessment & Plan   Assessment / Plan   Assessment/Plan:   #1 left lower leg cellulitis and abscess   -Wound culture growing gram-positive cocci in chains clusters and pairs  -Started on vancomycin and cefepime.  De-escalate as able.  -IV fluid  -Blood cultures pending  -We will order an echo to rule out any endocarditis.    #2 IV methamphetamine use  -As needed Ativan for anxiety and any withdrawals.    #3 concern for sepsis because of fever, tachycardia, leukocytosis, elevated lactic acid    DVT prophylaxis:  Medical DVT prophylaxis orders are present.    CODE STATUS:    Level Of Support Discussed With: Patient  Code Status (Patient has no pulse and is not breathing): CPR (Attempt to  Resuscitate)  Medical Interventions (Patient has pulse or is breathing): Full Support      Admission Status:  I believe this patient meets observation status.    Electronically signed by Candy Siddiqui DO, 06/18/22, 9:10 PM EDT.

## 2022-06-19 NOTE — THERAPY EVALUATION
Acute Care - Physical Therapy Initial Evaluation   [place]     Patient Name: Darci Kumar  : 1990  MRN: [id number]  Today's Date: 2022   Onset of Illness/Injury or Date of Surgery: 22  Visit Dx:     ICD-10-CM ICD-9-CM   1. Sepsis without acute organ dysfunction, due to unspecified organism ([facility])  A41.9 038.9     995.91   2. Cellulitis and abscess of left leg  L03.116 682.6    L02.416    3. IV drug abuse ([facility])  F19.10 305.90   4. Difficulty in walking  R26.2 719.7     Patient Active Problem List   Diagnosis   • Bipolar disorder (HCC)   • Sepsis (HCC)     Past Medical History:   Diagnosis Date   • Bipolar disorder (HCC)      History reviewed. No pertinent surgical history.  PT Assessment (last 12 hours)     PT Evaluation and Treatment     Row Name 22 0939          Physical Therapy Time and Intention    Subjective Information no complaints  -DW     Document Type evaluation  -DW     Mode of Treatment individual therapy;physical therapy  -DW     Patient Effort excellent  -DW     Symptoms Noted During/After Treatment none  -DW     Row Name 22 0939          General Information    Patient Profile Reviewed yes  -DW     Onset of Illness/Injury or Date of Surgery 22  -DW     Referring Physician Alcides Harmon  -CINDY     Prior Level of Function independent:;ADL's;all household mobility;community mobility  -DW     Equipment Currently Used at Home none  -DW     Benefits Reviewed patient:  -DW     Row Name 22 0939          Previous Level of Function/Home Environm    BADLs, Premorbid Functional Level independent  -DW     IADLs, Premorbid Functional Level independent  -DW     Bed Mobility, Premorbid Functional Level independent  -DW     Transfers, Premorbid Functional Level independent  -DW     Household Ambulation, Premorbid Functional Level independent  -DW     Stairs, Premorbid Functional Level independent  -DW     Community Ambulation, Premorbid Functional Level independent  -DW      Row Name 06/19/22 0939          Living Environment    Current Living Arrangements home  -DW     People in Home significant other  -DW     Primary Care Provided by self  -     Row Name 06/19/22 0939          Pain    Pretreatment Pain Rating 0/10 - no pain  -DW     Row Name 06/19/22 0939          Cognition    Affect/Mental Status (Cognition) WNL  -     Orientation Status (Cognition) oriented x 3  -DW     Row Name 06/19/22 0939          Range of Motion Comprehensive    General Range of Motion no range of motion deficits identified  -     Row Name 06/19/22 0939          Strength Comprehensive (MMT)    General Manual Muscle Testing (MMT) Assessment no strength deficits identified  -     Row Name 06/19/22 0939          Bed Mobility    Bed Mobility bed mobility (all) activities  -     All Activities, [place] (Bed Mobility) independent  -DW     Row Name 06/19/22 0939          Transfers    Transfers bed-chair transfer  -DW     Bed-Chair [place] (Transfers) independent  -     Row Name 06/19/22 0939          Gait/Stairs (Locomotion)    Gait/Stairs Locomotion gait/ambulation independence;gait/ambulation assistive device;distance ambulated  -DW     [place] Level (Gait) independent  -DW     Distance in Feet (Gait) 15  -DW     Row Name 06/19/22 0939          Balance    Balance Assessment standing dynamic balance  -     Dynamic Standing Balance independent  -DW     Row Name             Wound 06/18/22 2359 Left lower leg Incision    Wound - Properties Group Placement Date: 06/18/22  - Placement Time: 2359 -LH Present on Hospital Admission: Y  -LH Side: Left  -LH Orientation: lower  -LH Location: leg  -LH Primary Wound Type: Incision  -LH     Retired Wound - Properties Group Placement Date: 06/18/22  - Placement Time: 2359 -LH Present on Hospital Admission: Y  -LH Side: Left  -LH Orientation: lower  -LH Location: leg  -LH Primary Wound Type: Incision  -LH     Retired Wound - Properties Group Date  first assessed: 06/18/22  - Time first assessed: 2359  - Present on Hospital Admission: Y  - Side: Left  - Location: leg  - Primary Wound Type: Incision  -LH     Row Name 06/19/22 0939          Plan of Care Review    Plan of Care Reviewed With patient  -     Progress improving  -     Outcome Evaluation Pt is at prior level of function therefore skilled PT intervention is not needed.  DC PT at this time.  -     Row Name 06/19/22 0939          Therapy Assessment/Plan (PT)    PT Diagnosis (PT) Difficulty in walking  -     Rehab Potential (PT) good, to achieve stated therapy goals  -     Criteria for Skilled Interventions Met (PT) no;no problems identified which require skilled intervention  -DW     Therapy Frequency (PT) evaluation only  -     Row Name 06/19/22 0939          PT Evaluation Complexity    History, PT Evaluation Complexity no personal factors and/or comorbidities  -DW     Examination of Body Systems (PT Eval Complexity) 1-2 elements  -DW     Clinical Presentation (PT Evaluation Complexity) stable  -     Clinical Decision Making (PT Evaluation Complexity) low complexity  -     Overall Complexity (PT Evaluation Complexity) low complexity  -     Row Name 06/19/22 0939          Therapy Plan Review/Discharge Plan (PT)    Therapy Plan Review (PT) evaluation/treatment results reviewed  -           User Key  (r) = Recorded By, (t) = Taken By, (c) = Cosigned By    Initials Name Provider Type     Liu Johnson, RN Registered Nurse    Marcos Oleary, PT Physical Therapist                Physical Therapy Education                 Title: PT OT SLP Therapies (Done)     Topic: Physical Therapy (Done)     Point: Mobility training (Done)     Learning Progress Summary           Patient Acceptance, E,TB, VU by CINDY at 6/19/2022 0944                   Point: Home exercise program (Done)     Learning Progress Summary           Patient Acceptance, E,TB, VU by CINDY at 6/19/2022 0944                    Point: Body mechanics (Done)     Learning Progress Summary           Patient Acceptance, E,TB, VU by CINDY at 6/19/2022 0944                   Point: Precautions (Done)     Learning Progress Summary           Patient Acceptance, E,TB, VU by CINDY at 6/19/2022 0944                               User Key     Initials Effective Dates Name Provider Type Discipline     04/25/21 -  Marcos Juarez, KELLI Physical Therapist PT              PT Recommendation and Plan  Anticipated Discharge Disposition (PT): home  Therapy Frequency (PT): evaluation only  Plan of Care Reviewed With: patient  Progress: improving  Outcome Evaluation: Pt is at prior level of function therefore skilled PT intervention is not needed.  DC PT at this time.   Outcome Measures     Row Name 06/19/22 0943 06/19/22 0900          How much help from another person do you currently need...    Turning from your back to your side while in flat bed without using bedrails? 4  -DW --     Moving from lying on back to sitting on the side of a flat bed without bedrails? 4  -DW --     Moving to and from a bed to a chair (including a wheelchair)? 4  -DW --     Standing up from a chair using your arms (e.g., wheelchair, bedside chair)? 4  -DW --     Climbing 3-5 steps with a railing? 4  -DW --     To walk in hospital room? 4  -DW --     AM-PAC 6 Clicks Score (PT) 24  -DW --            Functional Assessment    Outcome Measure Options AM-PAC 6 Clicks Basic Mobility (PT)  -DW AM-PAC 6 Clicks Basic Mobility (PT)  -DW           User Key  (r) = Recorded By, (t) = Taken By, (c) = Cosigned By    Initials Name Provider Type    Marcos Oleary PT Physical Therapist                 Time Calculation:    PT Charges     Row Name 06/19/22 0945             Time Calculation    PT Received On 06/19/22  -DW              Untimed Charges    PT Eval/Re-eval Minutes 10  -DW              Total Minutes    Untimed Charges Total Minutes 10  -DW       Total Minutes 10  -DW            User Key   (r) = Recorded By, (t) = Taken By, (c) = Cosigned By    Initials Name Provider Type    DW Marcos Juarez, PT Physical Therapist              Therapy Charges for Today     Code Description Service Date Service Provider Modifiers Qty    75883110617 HC PT EVAL LOW COMPLEXITY 2 6/19/2022 Marcos Juarez, PT GP 1          PT G-Codes  Outcome Measure Options: AM-PAC 6 Clicks Basic Mobility (PT)  AM-PAC 6 Clicks Score (PT): 24    Marcos Juarez PT  6/19/2022

## 2022-06-19 NOTE — PLAN OF CARE
Goal Outcome Evaluation: Medicated for pain twice this shift and ativan once for anxiety. Dressing dry and intact to LLE. Edema slightly better from initial assessment this morning. Resting in bed.

## 2022-06-19 NOTE — PROGRESS NOTES
"Pharmacy to Dose Vancomycin Day: 1    Darci Kumar is a 31 y.o.male admitted with left lower leg cellulitis. Pharmacy has been consulted to dose IV Vancomycin     Consulting Provider: Dr. Siddiqui  Clinical Indication: bacteremia   Pertinent Past Medical History:   Red wound on lower left leg x 2 days. Prescribed keflex and bactrim from outside ER   IV drug use   Goal -600 mg/L.hr  Duration of therapy: 10 days     177.8 cm (70\")       06/18/22  1620      Weight: 85.5 kg (188 lb 7.9 oz)         Estimated Creatinine Clearance: 127.1 mL/min (by C-G formula based on SCr of 1 mg/dL).  Results from last 7 days  Lab  Units  06/18/22  1635  BUN  mg/dL  8  CREATININE  mg/dL  1.00      HD/PD/CRRT?: No    Lab Results   Component Value Date    WBC 17.75 (H) 06/18/2022      Temperature    06/18/22 1620   Temp: 99.1 °F (37.3 °C)        Contrast Administered: No    Relevant Micro:   Microbiology Results (last 10 days)       Procedure Component Value - Date/Time    Wound Culture - Wound, Leg, Left [[id number]] Collected: 06/18/22 1635    Lab Status: Preliminary result Specimen: Wound from Leg, Left Updated: 06/18/22 1827     Gram Stain Few (2+) Gram positive cocci in pairs, chains and clusters             Relevant Radiology: XRAY L Tibia Fibula: No acute fracture or acute malalignment is identified.    Other Antimicrobial Therapy: Cefepime  On bactrim and keflex PTA    Assessment/Plan  Loading dose: Vancomycin 1750 mg   Regimen: vancomycin 1250 mg every 12 hours   Exposure Target: AUC24 (range) 400-600 mg/L.hr    Patient admitted with left lower extremity cellulitis, started on Vancomycin and Cefepime. Vancomycin 1750 mg administered as loading dose followed by Vancomycin 1250 mg every 12 hours for a predicted AUCss 532 mg/L.hr. Vancomycin level ordered 6/20 to assess clearance    Labs ordered: BMP x 3 days     Thank you for the consult.  Margaret Padgett, PharmD      "

## 2022-06-20 LAB
BACTERIA SPEC AEROBE CULT: ABNORMAL
BACTERIA SPEC AEROBE CULT: ABNORMAL
BH CV ECHO MEAS - AO ROOT DIAM: 3.4 CM
BH CV ECHO MEAS - EDV(MOD-SP2): 73 ML
BH CV ECHO MEAS - EDV(MOD-SP4): 73 ML
BH CV ECHO MEAS - EF(MOD-BP): 51.5 %
BH CV ECHO MEAS - ESV(MOD-SP2): 36 ML
BH CV ECHO MEAS - ESV(MOD-SP4): 35 ML
BH CV ECHO MEAS - IVSD: 0.9 CM
BH CV ECHO MEAS - LA DIMENSION: 3.1 CM
BH CV ECHO MEAS - LAT PEAK E' VEL: 12.3 CM/SEC
BH CV ECHO MEAS - LVIDD: 5.4 CM
BH CV ECHO MEAS - LVIDS: 3.5 CM
BH CV ECHO MEAS - LVOT DIAM: 2 CM
BH CV ECHO MEAS - LVPWD: 0.8 CM
BH CV ECHO MEAS - MED PEAK E' VEL: 10.1 CM/SEC
BH CV ECHO MEAS - MV A MAX VEL: 57 CM/SEC
BH CV ECHO MEAS - MV DEC TIME: 163 MSEC
BH CV ECHO MEAS - MV E MAX VEL: 76 CM/SEC
BH CV ECHO MEAS - MV E/A: 1.3
BH CV ECHO MEAS - RVDD: 2.2 CM
BH CV ECHO MEAS - TAPSE (>1.6): 2.43 CM
BH CV ECHO MEASUREMENTS AVERAGE E/E' RATIO: 6.79
GRAM STN SPEC: ABNORMAL
GRAM STN SPEC: ABNORMAL
HIV1+2 AB SER QL: NORMAL
IVRT: 69 MSEC
LEFT ATRIUM VOLUME INDEX: 15.1 ML/M2
MAGNESIUM SERPL-MCNC: 1.9 MG/DL (ref 1.6–2.6)
MAXIMAL PREDICTED HEART RATE: 187 BPM
PHOSPHATE SERPL-MCNC: 3.5 MG/DL (ref 2.5–4.5)
STRESS TARGET HR: 159 BPM
VANCOMYCIN SERPL-MCNC: 4.62 MCG/ML (ref 5–40)

## 2022-06-20 PROCEDURE — 86706 HEP B SURFACE ANTIBODY: CPT | Performed by: PHYSICIAN ASSISTANT

## 2022-06-20 PROCEDURE — 36415 COLL VENOUS BLD VENIPUNCTURE: CPT | Performed by: HOSPITALIST

## 2022-06-20 PROCEDURE — 99225 PR SBSQ OBSERVATION CARE/DAY 25 MINUTES: CPT | Performed by: FAMILY MEDICINE

## 2022-06-20 PROCEDURE — 25010000002 CEFEPIME PER 500 MG: Performed by: HOSPITALIST

## 2022-06-20 PROCEDURE — G0378 HOSPITAL OBSERVATION PER HR: HCPCS

## 2022-06-20 PROCEDURE — 84100 ASSAY OF PHOSPHORUS: CPT | Performed by: HOSPITALIST

## 2022-06-20 PROCEDURE — 97165 OT EVAL LOW COMPLEX 30 MIN: CPT

## 2022-06-20 PROCEDURE — 83735 ASSAY OF MAGNESIUM: CPT | Performed by: HOSPITALIST

## 2022-06-20 PROCEDURE — 87340 HEPATITIS B SURFACE AG IA: CPT | Performed by: PHYSICIAN ASSISTANT

## 2022-06-20 PROCEDURE — 86704 HEP B CORE ANTIBODY TOTAL: CPT | Performed by: PHYSICIAN ASSISTANT

## 2022-06-20 PROCEDURE — 80202 ASSAY OF VANCOMYCIN: CPT | Performed by: HOSPITALIST

## 2022-06-20 PROCEDURE — 25010000002 VANCOMYCIN 5 G RECONSTITUTED SOLUTION: Performed by: HOSPITALIST

## 2022-06-20 PROCEDURE — 86803 HEPATITIS C AB TEST: CPT | Performed by: PHYSICIAN ASSISTANT

## 2022-06-20 RX ORDER — POLYETHYLENE GLYCOL 3350 17 G
2 POWDER IN PACKET (EA) ORAL
Status: DISCONTINUED | OUTPATIENT
Start: 2022-06-20 | End: 2022-06-21 | Stop reason: HOSPADM

## 2022-06-20 RX ADMIN — NICOTINE 1 PATCH: 21 PATCH, EXTENDED RELEASE TRANSDERMAL at 08:06

## 2022-06-20 RX ADMIN — Medication 10 ML: at 20:07

## 2022-06-20 RX ADMIN — HYDROCODONE BITARTRATE AND ACETAMINOPHEN 1 TABLET: 5; 325 TABLET ORAL at 20:12

## 2022-06-20 RX ADMIN — HYDROCODONE BITARTRATE AND ACETAMINOPHEN 1 TABLET: 5; 325 TABLET ORAL at 05:46

## 2022-06-20 RX ADMIN — SODIUM CHLORIDE 100 ML/HR: 9 INJECTION, SOLUTION INTRAVENOUS at 07:09

## 2022-06-20 RX ADMIN — NICOTINE POLACRILEX 2 MG: 2 LOZENGE ORAL at 14:54

## 2022-06-20 RX ADMIN — Medication 10 ML: at 08:04

## 2022-06-20 RX ADMIN — HYDROCODONE BITARTRATE AND ACETAMINOPHEN 1 TABLET: 5; 325 TABLET ORAL at 01:40

## 2022-06-20 RX ADMIN — NICOTINE POLACRILEX 2 MG: 2 LOZENGE ORAL at 20:18

## 2022-06-20 RX ADMIN — HYDROCODONE BITARTRATE AND ACETAMINOPHEN 1 TABLET: 5; 325 TABLET ORAL at 12:09

## 2022-06-20 RX ADMIN — VANCOMYCIN HYDROCHLORIDE 1750 MG: 5 INJECTION, POWDER, LYOPHILIZED, FOR SOLUTION INTRAVENOUS at 20:07

## 2022-06-20 RX ADMIN — CEFEPIME HYDROCHLORIDE 1 G: 1 INJECTION, POWDER, FOR SOLUTION INTRAMUSCULAR; INTRAVENOUS at 05:46

## 2022-06-20 RX ADMIN — CARIPRAZINE 3 MG: 1.5 CAPSULE, GELATIN COATED ORAL at 09:25

## 2022-06-20 RX ADMIN — LORAZEPAM 0.5 MG: 0.5 TABLET ORAL at 09:25

## 2022-06-20 RX ADMIN — CEFEPIME HYDROCHLORIDE 1 G: 1 INJECTION, POWDER, FOR SOLUTION INTRAMUSCULAR; INTRAVENOUS at 12:09

## 2022-06-20 RX ADMIN — LORAZEPAM 0.5 MG: 0.5 TABLET ORAL at 21:46

## 2022-06-20 RX ADMIN — VANCOMYCIN HYDROCHLORIDE 1250 MG: 5 INJECTION, POWDER, LYOPHILIZED, FOR SOLUTION INTRAVENOUS at 08:05

## 2022-06-20 NOTE — PROGRESS NOTES
[facility]   Hospitalist Progress Note  Date: 2022  Patient Name: Darci Kumar  : 1990  MRN: [id number]  Date of admission: 2022      Subjective   Subjective     Chief complaint: Wound infection     Summary:  31-year-old male with history of bipolar disorder, IV drug abuse, hospitalized on 2022 with left lower extremity cellulitis, failed outpatient therapy, hospitalized for further monitoring and management, detected MRSA from his wound culture, he is on vancomycin until we have sensitivities, bedside ultrasound showed abscess, status post I&D at bedside in the emergency room     Interval follow-up: Patient seen and examined this morning, no acute distress, no acute major night events, notes left lower extremity swelling and tenderness is slightly improved, still shows signs of erythema and warmth.  Wound culture remains pending, MRSA was detected in the wound culture sensitivities remain to be determined.  Telemetry reviewed, no acute major events.  Afebrile past 24 hours.    Review of systems:  All systems reviewed and negative except for left lower extremity pain, fatigue    Objective   Objective     Vitals:   Temp:  [97.7 °F (36.5 °C)-99.1 °F (37.3 °C)] 97.8 °F (36.6 °C)  Heart Rate:  [68-86] 86  Resp:  [16-18] 18  BP: (108-125)/(56-83) 125/63  Physical Exam    Constitutional: Awake, alert, no acute distress              Eyes: Pupils equal, sclerae anicteric, no conjunctival injection              HENT: NCAT, mucous membranes moist              Neck: Supple, full range of motion              Respiratory: Clear to auscultation bilaterally, nonlabored respirations               Cardiovascular: RRR, no murmurs, rubs, or gallops, palpable pedal pulses bilaterally              Gastrointestinal: Positive bowel sounds, soft, nontender, nondistended              Musculoskeletal: No bilateral ankle edema, no clubbing or cyanosis to extremities              Psychiatric: Appropriate affect,  cooperative              Neurologic: Oriented x 3, strength symmetric in all extremities, Cranial Nerves grossly intact to confrontation, speech clear              Skin: No rashes, left lower extremity with significant erythema, dressed, tender with swelling, drainage through gauze dressing  Result Review    Result Review:  I have personally reviewed the results from the time of this admission to 6/20/2022 14:47 EDT and agree with these findings:  [x]  Laboratory   CBC    CBC 6/18/22   WBC 17.75 (A)   RBC 3.91 (A)   Hemoglobin 12.0 (A)   Hematocrit 37.1 (A)   MCV 94.9   MCH 30.7   MCHC 32.3   RDW 11.9 (A)   Platelets 141   (A) Abnormal value            BMP    BMP 6/18/22   BUN 8   Creatinine 1.00   Sodium 137   Potassium 3.8   Chloride 101   CO2 21.9 (A)   Calcium 9.0   (A) Abnormal value            LIVER FUNCTION TESTS:      Lab 06/18/22  1635   TOTAL PROTEIN 6.8   ALBUMIN 3.90   GLOBULIN 2.9   ALT (SGPT) 15   AST (SGOT) 18   BILIRUBIN 0.3   ALK PHOS 87       [x]  Microbiology   Blood Culture   Date Value Ref Range Status   06/18/2022 No growth at 24 hours  Preliminary     No results found for: BCIDPCR, CXREFLEX, CSFCX, CULTURETIS  No results found for: CULTURES, HSVCX, URCX  No results found for: EYECULTURE, GCCX, HSVCULTURE, LABHSV  No results found for: LEGIONELLA, MRSACX, MUMPSCX, MYCOPLASCX  No results found for: NOCARDIACX, STOOLCX  No results found for: THROATCX, UNSTIMCULT, URINECX, CULTURE, VZVCULTUR  Wound Culture   Date Value Ref Range Status   06/18/2022 Staphylococcus aureus, MRSA (A)  Final     Comment:     Methicillin resistant Staphylococcus aureus, Patient may be an isolation risk.       [x]  Radiology XR Tibia Fibula 2 View Left    Result Date: 6/18/2022  PROCEDURE: XR TIBIA FIBULA 2 VW LEFT  COMPARISON: None.  INDICATIONS: infection; eval FB vs soft tissue gas in anterior lower leg  FINDINGS: Two views were obtained.  No acute fracture or acute malalignment is identified.  No subcutaneous  emphysema.  No retained radiopaque foreign body.  Soft tissue swelling is seen.  No radiographic evidence of osteomyelitis.  If symptoms or clinical concerns persist, consider imaging follow-up.       No acute fracture or acute malalignment is identified.     COMMENT:  Part of this note is an electronic transcription of spoken language to printed text. The electronic translation/transcription may permit erroneous, or at times, nonsensical (or even sensical) words or phrases to be inadvertently transcribed or omitted; this  has reviewed the note for such errors (as well as additional errors); however, some may still exist.  SALOMÓN LOMELI JR, MD       Electronically Signed and Approved By: SALOMÓN LOMELI JR, MD on 6/18/2022 at 19:22                [x]  EKG/Telemetry   []  Cardiology/Vascular   []  Pathology  [x]  Old records  []  Other:    Assessment & Plan   Assessment / Plan     Assessment/Plan:  Assessment:  Left lower extremity cellulitis with abscess status post I&D, infection secondary to MRSA  Bipolar disorder  IV drug abuse  Sepsis secondary to cellulitis  Lactic acidosis  Metabolic acidosis     Plan:  Labs and imaging reviewed  Need to follow-up on wound cultures once available  Continue vancomycin  Wound care consult for dressing changes  Continue cefepime  Monitor and dose vancomycin by AUC method  Labs tomorrow to check white count and renal function  Pain control with p.o. opioids, IV Toradol, Tylenol.  A.m. labs  Full code  VTE prophylaxis with Lovenox  Clinical course to dictate further management  Discussed with nurse at the bedside    DVT prophylaxis:  Medical DVT prophylaxis orders are present.    CODE STATUS:   Level Of Support Discussed With: Patient  Code Status (Patient has no pulse and is not breathing): CPR (Attempt to Resuscitate)  Medical Interventions (Patient has pulse or is breathing): Full Support        Electronically signed by Alcides Harmon MD, 06/20/22, 2:47 PM  EDT.

## 2022-06-20 NOTE — PROGRESS NOTES
"Pharmacy to Dose Vancomycin Day: 3    Darci Kumar is a 31 y.o.male admitted with left lower leg cellulitis. Pharmacy has been consulted to dose IV Vancomycin     Consulting Provider: Dr. Siddiqui  Clinical Indication: bacteremia   Pertinent Past Medical History:   Red wound on lower left leg x 2 days. Prescribed keflex and bactrim from outside ER   IV drug use   Goal -600 mg/L.hr  Duration of therapy: 10 days     177.8 cm (70\")       06/18/22 2310      Weight: 86 kg (189 lb 9.5 oz)         Estimated Creatinine Clearance: 127.8 mL/min (by C-G formula based on SCr of 1 mg/dL).  Results from last 7 days   Lab Units 06/18/22  1635   BUN mg/dL 8   CREATININE mg/dL 1.00     HD/PD/CRRT?: No    Lab Results   Component Value Date    WBC 17.75 (H) 06/18/2022      Temperature    06/19/22 2300 06/20/22 0300 06/20/22 0815   Temp: 98.9 °F (37.2 °C) 98 °F (36.7 °C) 97.7 °F (36.5 °C)     Contrast Administered: No    Relevant Micro:   Microbiology Results (last 10 days)       Procedure Component Value - Date/Time    MRSA Screen, PCR (Inpatient) - Swab, Nares [[id number]]  (Abnormal) Collected: 06/18/22 2310    Lab Status: Final result Specimen: Swab from Nares Updated: 06/19/22 0047     MRSA PCR MRSA Detected    Narrative:      The negative predictive value of this diagnostic test is high and should only be used to consider de-escalating anti-MRSA therapy. A positive result may indicate colonization with MRSA and must be correlated clinically.    Wound Culture - Wound, Leg, Left [[id number]] Collected: 06/18/22 2030    Lab Status: Preliminary result Specimen: Wound from Leg, Left Updated: 06/19/22 0314     Gram Stain No organisms seen    Blood Culture - Blood, Hand, Right [[id number]]  (Normal) Collected: 06/18/22 1635    Lab Status: Preliminary result Specimen: Blood from Hand, Right Updated: 06/19/22 1703     Blood Culture No growth at 24 hours    Wound Culture - Wound, Leg, Left [[id number]]  (Abnormal)  (Susceptibility) " Collected: 06/18/22 1635    Lab Status: Final result Specimen: Wound from Leg, Left Updated: 06/20/22 0819     Wound Culture Light growth (2+) Staphylococcus aureus, MRSA     Comment: Methicillin resistant Staphylococcus aureus, Patient may be an isolation risk.        Gram Stain Few (2+) Gram positive cocci in pairs, chains and clusters    Susceptibility        Staphylococcus aureus, MRSA      JUDD      Clindamycin Susceptible      Erythromycin Resistant      Inducible Clindamycin Resistance Negative      Oxacillin Resistant      Rifampin Susceptible      Tetracycline Susceptible      Trimethoprim + Sulfamethoxazole Susceptible      Vancomycin Susceptible                       Susceptibility Comments       Staphylococcus aureus, MRSA    This isolate does not demonstrate inducible clindamycin resistance in vitro.                        Relevant Radiology: XRAY L Tibia Fibula: No acute fracture or acute malalignment is identified.    Other Antimicrobial Therapy: Cefepime  On bactrim and keflex PTA    Assessment/Plan  Regimen: 1250 mg IV every 12 hours.  Exposure target: AUC24 (range) 400-600 mg/L.hr   AUC24,ss: 357 mg/L.hr  PAUC*: 28 %  Ctrough,ss: 8.0 mg/L  Pconc*: 0 %  Tox.: 5 %    AUC below goal, will increase dose to better target 400-600 AUC    New dose: 1750 mg Q12H  AUC24,ss: 499 mg/L.hr  PAUC*: 87 %  Ctrough,ss: 11.4 mg/L  Pconc*: 3 %  Tox.: 7 %    Recommend new trough prior to 4th dose 6/22    Labs ordered: BMP

## 2022-06-20 NOTE — PLAN OF CARE
Goal Outcome Evaluation:  Plan of Care Reviewed With: patient        Progress: no change  Outcome Evaluation: Patient presents at baseline functional status with no deficits related to strength, balance, transfers or mobility that impede patient independence with activities of daily living. No indicated need for skilled occupational therapy intervention in acute care setting. OT will sign off at this time. Recommend patient discharge home.

## 2022-06-20 NOTE — PLAN OF CARE
Goal Outcome Evaluation:  Pt complained of pain through night and received Norco Q4 for leg pain.  Pt. Is concerned and asks questions regarding antibiotics he is receiving.  NORMA BRADY RN

## 2022-06-20 NOTE — THERAPY EVALUATION
Patient Name: Darci Kumar  : 1990    MRN: [id number]                              Today's Date: 2022       Admit Date: 2022    Visit Dx:     ICD-10-CM ICD-9-CM   1. Sepsis without acute organ dysfunction, due to unspecified organism (HCC)  A41.9 038.9     995.91   2. Cellulitis and abscess of left leg  L03.116 682.6    L02.416    3. IV drug abuse (HCC)  F19.10 305.90   4. Difficulty in walking  R26.2 719.7   5. Decreased activities of daily living (ADL)  Z78.9 V49.89     Patient Active Problem List   Diagnosis   • Bipolar disorder (HCC)   • Sepsis (HCC)     Past Medical History:   Diagnosis Date   • Bipolar disorder (HCC)      History reviewed. No pertinent surgical history.   General Information     Row Name 22 1543          OT Time and Intention    Document Type evaluation  -ES     Mode of Treatment individual therapy;occupational therapy  -ES     Row Name 22 1543          General Information    Patient Profile Reviewed yes  -ES     Prior Level of Function independent:  Patient reports independent with B and IADLs. No device for functional mobility. Tub/shower combo, standing shower completion. No home O2 use.  -ES     Existing Precautions/Restrictions no known precautions/restrictions  -ES     Barriers to Rehab none identified  -ES     Row Name 22 1543          Occupational Profile    Reason for Services/Referral (Occupational Profile) Patient is 31 yr old male admitted to [facility] on 2022 with left leg cellulitis and leg abscess. Patient PMH significant for IV drug use. OT evaluation and treatment ordered d/t recent decline in ADLs/transfer ability. No previous OT services for current condition.  -ES     Row Name 22 1544          Living Environment    People in Home significant other  -ES     Row Name 22 1543          Home Main Entrance    Number of Stairs, Main Entrance none  -ES     Row Name 22 1546          Stairs Within Home, Primary    Number of  Stairs, Within Home, Primary none  -ES     Row Name 06/20/22 1543          Cognition    Orientation Status (Cognition) oriented x 4  patient pleasant and cooperative, agreeable to therapy evaluation  -ES           User Key  (r) = Recorded By, (t) = Taken By, (c) = Cosigned By    Initials Name Provider Type    Saritha Park, OT Occupational Therapist                 Mobility/ADL's     Row Name 06/20/22 1545          Bed Mobility    Bed Mobility supine-sit;sit-supine  -ES     Supine-Sit [place] (Bed Mobility) independent  -ES     Sit-Supine [place] (Bed Mobility) independent  -ES     Row Name 06/20/22 1545          Transfers    Transfers sit-stand transfer  -ES     Bed-Chair [place] (Transfers) independent  -ES     Sit-Stand [place] (Transfers) independent  -ES     Row Name 06/20/22 1545          Sit-Stand Transfer    Assistive Device (Sit-Stand Transfers) other (see comments)  no assistive device  -ES     Row Name 06/20/22 1545          Functional Mobility    Functional Mobility- Ind. Level independent  -ES     Functional Mobility- Device other (see comments)  no assistive device  -ES     Row Name 06/20/22 1545          Activities of Daily Living    BADL Assessment/Intervention bathing;upper body dressing;lower body dressing;grooming;feeding;toileting  -ES     Row Name 06/20/22 1545          Bathing Assessment/Intervention    [place] Level (Bathing) bathing skills;independent  -ES     Row Name 06/20/22 1545          Lower Body Dressing Assessment/Training    [place] Level (Lower Body Dressing) lower body dressing skills;independent  -ES     Row Name 06/20/22 1545          Grooming Assessment/Training    [place] Level (Grooming) grooming skills;independent  -ES     Row Name 06/20/22 1545          Self-Feeding Assessment/Training    [place] Level (Feeding) feeding skills;independent  -ES     Row Name 06/20/22 1545          Toileting Assessment/Training    [place] Level  (Toileting) toileting skills;independent  -ES           User Key  (r) = Recorded By, (t) = Taken By, (c) = Cosigned By    Initials Name Provider Type    Saritha Park OT Occupational Therapist               Obj/Interventions     Row Name 06/20/22 1546          Sensory Assessment (Somatosensory)    Sensory Assessment (Somatosensory) bilateral UE  -ES     Bilateral UE Sensory Assessment general sensation;intact  -ES     [facility] Name 06/20/22 1546          Vision Assessment/Intervention    Visual Impairment/Limitations WFL  -ES     Row Name 06/20/22 1546          Range of Motion Comprehensive    General Range of Motion no range of motion deficits identified  -ES     Row Name 06/20/22 1546          Strength Comprehensive (MMT)    General Manual Muscle Testing (MMT) Assessment no strength deficits identified  -ES     Row Name 06/20/22 1546          Balance    Balance Assessment sitting dynamic balance;standing dynamic balance  -ES     Dynamic Sitting Balance independent  -ES     Dynamic Standing Balance independent  -ES           User Key  (r) = Recorded By, (t) = Taken By, (c) = Cosigned By    Initials Name Provider Type    Saritha Park OT Occupational Therapist               Goals/Plan    No documentation.                Clinical Impression     Row Name 06/20/22 1546          Plan of Care Review    Plan of Care Reviewed With patient  -ES     Progress no change  -ES     Outcome Evaluation Patient presents at baseline functional status with no deficits related to strength, balance, transfers or mobility that impede patient independence with activities of daily living. No indicated need for skilled occupational therapy intervention in acute care setting. OT will sign off at this time. Recommend patient discharge home.  -ES     Row Name 06/20/22 1546          Therapy Assessment/Plan (OT)    Criteria for Skilled Therapeutic Interventions Met (OT) no problems identified which require skilled intervention  -ES      Therapy Frequency (OT) evaluation only  -ES     Row Name 06/20/22 1546          Therapy Plan Review/Discharge Plan (OT)    Anticipated Discharge Disposition (OT) home  -ES           User Key  (r) = Recorded By, (t) = Taken By, (c) = Cosigned By    Initials Name Provider Type    Saritha Park OT Occupational Therapist               Outcome Measures     Row Name 06/20/22 1547          How much help from another is currently needed...    Putting on and taking off regular lower body clothing? 4  -ES     Bathing (including washing, rinsing, and drying) 4  -ES     Toileting (which includes using toilet bed pan or urinal) 4  -ES     Putting on and taking off regular upper body clothing 4  -ES     Taking care of personal grooming (such as brushing teeth) 4  -ES     Eating meals 4  -ES     AM-PAC 6 Clicks Score (OT) 24  -ES     Row Name 06/20/22 0705          How much help from another person do you currently need...    Turning from your back to your side while in flat bed without using bedrails? 4  -LF     Moving from lying on back to sitting on the side of a flat bed without bedrails? 4  -LF     Moving to and from a bed to a chair (including a wheelchair)? 4  -LF     Standing up from a chair using your arms (e.g., wheelchair, bedside chair)? 4  -LF     Climbing 3-5 steps with a railing? 4  -LF     To walk in hospital room? 4  -LF     AM-PAC 6 Clicks Score (PT) 24  -LF     Highest level of mobility 8 --> Walked 250 feet or more  -LF     Row Name 06/20/22 1547          Functional Assessment    Outcome Measure Options AM-PAC 6 Clicks Daily Activity (OT);Optimal Instrument  -ES     Row Name 06/20/22 1547          Optimal Instrument    Optimal Instrument Optimal - 3  -ES     Bending/Stooping 1  -ES     Standing 1  -ES     Reaching 1  -ES     From the list, choose the 3 activities you would most like to be able to do without any difficulty Bending/stooping;Standing;Reaching  -ES     Total Score Optimal - 3 3  -ES            User Key  (r) = Recorded By, (t) = Taken By, (c) = Cosigned By    Initials Name Provider Type     Mely Arellano, RN Registered Nurse    Saritha Park OT Occupational Therapist                Occupational Therapy Education                 Title: PT OT SLP Therapies (Done)     Topic: Occupational Therapy (Done)     Point: ADL training (Done)     Description:   Instruct learner(s) on proper safety adaptation and remediation techniques during self care or transfers.   Instruct in proper use of assistive devices.              Learning Progress Summary           Patient Acceptance, E,TB, VU by NEO at 6/20/2022 1548                   Point: Home exercise program (Done)     Description:   Instruct learner(s) on appropriate technique for monitoring, assisting and/or progressing therapeutic exercises/activities.              Learning Progress Summary           Patient Acceptance, E,TB, VU by NEO at 6/20/2022 1548                   Point: Precautions (Done)     Description:   Instruct learner(s) on prescribed precautions during self-care and functional transfers.              Learning Progress Summary           Patient Acceptance, E,TB, VU by NEO at 6/20/2022 1548                   Point: Body mechanics (Done)     Description:   Instruct learner(s) on proper positioning and spine alignment during self-care, functional mobility activities and/or exercises.              Learning Progress Summary           Patient Acceptance, E,TB, VU by NEO at 6/20/2022 1548                               User Key     Initials Effective Dates Name Provider Type Discipline    NEO 09/13/21 -  Saritha Tomlin OT Occupational Therapist OT              OT Recommendation and Plan  Therapy Frequency (OT): evaluation only  Plan of Care Review  Plan of Care Reviewed With: patient  Progress: no change  Outcome Evaluation: Patient presents at baseline functional status with no deficits related to strength, balance, transfers or mobility that impede  patient independence with activities of daily living. No indicated need for skilled occupational therapy intervention in acute care setting. OT will sign off at this time. Recommend patient discharge home.     Time Calculation:    Time Calculation- OT     Row Name 06/20/22 1548             Time Calculation- OT    OT Received On 06/20/22  -ES              Untimed Charges    OT Eval/Re-eval Minutes 32  -ES              Total Minutes    Untimed Charges Total Minutes 32  -ES       Total Minutes 32  -ES            User Key  (r) = Recorded By, (t) = Taken By, (c) = Cosigned By    Initials Name Provider Type    Saritha Park OT Occupational Therapist              Therapy Charges for Today     Code Description Service Date Service Provider Modifiers Qty    38764253265 HC OT EVAL LOW COMPLEXITY 3 6/20/2022 Saritha Tomlin OT GO 1               Saritha Tomlin OT  6/20/2022

## 2022-06-20 NOTE — PLAN OF CARE
Goal Outcome Evaluation: Wound care completed by wound nurse and orders placed. Pain medication given and improvement noted. VSS. No needs at this time.

## 2022-06-20 NOTE — SIGNIFICANT NOTE
Wound Eval / Progress Noted    [facility]     Patient Name: Darci Kumar  : 1990  MRN: [id number]  Today's Date: 2022  Onset of Illness/Injury or Date of Surgery: 22      Referring Physician: Alcides Harmon       Admit Date: 2022    Visit Dx:    ICD-10-CM ICD-9-CM   1. Sepsis without acute organ dysfunction, due to unspecified organism ([facility])  A41.9 038.9     995.91   2. Cellulitis and abscess of left leg  L03.116 682.6    L02.416    3. IV drug abuse ([facility])  F19.10 305.90   4. Difficulty in walking  R26.2 719.7       Patient Active Problem List   Diagnosis    Bipolar disorder ([facility])    Sepsis ([facility])        Past Medical History:   Diagnosis Date    Bipolar disorder ([facility])         History reviewed. No pertinent surgical history.      Physical Assessment:  Wound 22 2359 Left lower leg Incision (Active)   Wound Image    22 1125   Dressing Appearance intact;moist drainage 22 1125   Closure None 22 1125   Base red;moist 22 1125   Periwound redness;swelling;edematous;warm 22 1125   Periwound Temperature warm 22 1125   Periwound Skin Turgor soft 22 1125   Edges open 22 1125   Wound Length (cm) 0.8 cm 22 1125   Wound Width (cm) 0.3 cm 22 1125   Wound Depth (cm) 0.4 cm 22 1125   Wound Surface Area (cm^2) 0.24 cm^2 22 1125   Wound Volume (cm^3) 0.096 cm^3 22 1125   Tunneling [Depth (cm)/Location] at 3 o'clock 5.2cm 22 1125   Undermining [Depth (cm)/Location] all around the clock 22 1125   Drainage Characteristics/Odor serosanguineous;purulent;yellow 22 1125   Drainage Amount moderate 22 1125   Care, Wound cleansed with;irrigated with;sterile normal saline 06/20/22 1125   Dressing Care dressing applied;hydrofiber;silver impregnated;packed with;silicone;border dressing 22   Periwound Care absorptive dressing applied 22        Wound Check / Follow-up:  Patient seen today for wound  consult. Incision and drainage performed in ED on 06/18/22. Small open area noted. No packing presently in wound. Cleansed and irrigated wound. With measurements and probing, increased purulent drainage expressed. Continued serosanguinous and purulent drainage obtained with palpating medial aspect of leg up towards opening. Cleansed and irrigated again. Recommending BID dressing changes with packing to leg until discharged home, then recommending daily dressings until drainage decreases. Warm compresses also recommended. Patient states his aunt might be able to do wound care at home.     Impression: Surgical incision to left lower leg    Short term goals:  Regain skin integrity, BID dressing changes    Deanne Gaona RN    6/20/2022    13:42 EDT

## 2022-06-21 ENCOUNTER — READMISSION MANAGEMENT (OUTPATIENT)
Dept: CALL CENTER | Facility: HOSPITAL | Age: 32
End: 2022-06-21

## 2022-06-21 VITALS
OXYGEN SATURATION: 100 % | WEIGHT: 189.6 LBS | RESPIRATION RATE: 18 BRPM | TEMPERATURE: 97.7 F | SYSTOLIC BLOOD PRESSURE: 135 MMHG | HEART RATE: 93 BPM | HEIGHT: 70 IN | DIASTOLIC BLOOD PRESSURE: 91 MMHG | BODY MASS INDEX: 27.14 KG/M2

## 2022-06-21 PROBLEM — B95.62 MRSA CELLULITIS: Status: ACTIVE | Noted: 2022-06-21

## 2022-06-21 PROBLEM — L03.90 MRSA CELLULITIS: Status: ACTIVE | Noted: 2022-06-21

## 2022-06-21 LAB
ALBUMIN SERPL-MCNC: 4 G/DL (ref 3.5–5.2)
ALP SERPL-CCNC: 97 U/L (ref 39–117)
ALT SERPL W P-5'-P-CCNC: 36 U/L (ref 1–41)
ANION GAP SERPL CALCULATED.3IONS-SCNC: 12.7 MMOL/L (ref 5–15)
AST SERPL-CCNC: 24 U/L (ref 1–40)
BASOPHILS # BLD AUTO: 0.06 10*3/MM3 (ref 0–0.2)
BASOPHILS NFR BLD AUTO: 0.7 % (ref 0–1.5)
BILIRUB CONJ SERPL-MCNC: <0.2 MG/DL (ref 0–0.3)
BILIRUB INDIRECT SERPL-MCNC: NORMAL MG/DL
BILIRUB SERPL-MCNC: 0.2 MG/DL (ref 0–1.2)
BUN SERPL-MCNC: 9 MG/DL (ref 6–20)
BUN/CREAT SERPL: 10.6 (ref 7–25)
CALCIUM SPEC-SCNC: 9.8 MG/DL (ref 8.6–10.5)
CHLORIDE SERPL-SCNC: 102 MMOL/L (ref 98–107)
CO2 SERPL-SCNC: 23.3 MMOL/L (ref 22–29)
CREAT SERPL-MCNC: 0.85 MG/DL (ref 0.76–1.27)
DEPRECATED RDW RBC AUTO: 39 FL (ref 37–54)
EGFRCR SERPLBLD CKD-EPI 2021: 117.7 ML/MIN/1.73
EOSINOPHIL # BLD AUTO: 0.34 10*3/MM3 (ref 0–0.4)
EOSINOPHIL NFR BLD AUTO: 4 % (ref 0.3–6.2)
ERYTHROCYTE [DISTWIDTH] IN BLOOD BY AUTOMATED COUNT: 11.7 % (ref 12.3–15.4)
GLUCOSE SERPL-MCNC: 141 MG/DL (ref 65–99)
HBV CORE AB SERPL QL IA: NEGATIVE
HBV SURFACE AB SER QL: REACTIVE
HBV SURFACE AG SERPL QL IA: NEGATIVE
HCT VFR BLD AUTO: 40.6 % (ref 37.5–51)
HCV AB S/CO SERPL IA: <0.1 S/CO RATIO (ref 0–0.9)
HCV AB SERPL QL IA: NORMAL
HGB BLD-MCNC: 13.9 G/DL (ref 13–17.7)
IMM GRANULOCYTES # BLD AUTO: 0.02 10*3/MM3 (ref 0–0.05)
IMM GRANULOCYTES NFR BLD AUTO: 0.2 % (ref 0–0.5)
LYMPHOCYTES # BLD AUTO: 1.69 10*3/MM3 (ref 0.7–3.1)
LYMPHOCYTES NFR BLD AUTO: 19.7 % (ref 19.6–45.3)
MAGNESIUM SERPL-MCNC: 2.1 MG/DL (ref 1.6–2.6)
MCH RBC QN AUTO: 31 PG (ref 26.6–33)
MCHC RBC AUTO-ENTMCNC: 34.2 G/DL (ref 31.5–35.7)
MCV RBC AUTO: 90.4 FL (ref 79–97)
MONOCYTES # BLD AUTO: 0.69 10*3/MM3 (ref 0.1–0.9)
MONOCYTES NFR BLD AUTO: 8.1 % (ref 5–12)
NEUTROPHILS NFR BLD AUTO: 5.76 10*3/MM3 (ref 1.7–7)
NEUTROPHILS NFR BLD AUTO: 67.3 % (ref 42.7–76)
NRBC BLD AUTO-RTO: 0 /100 WBC (ref 0–0.2)
PHOSPHATE SERPL-MCNC: 3.8 MG/DL (ref 2.5–4.5)
PLATELET # BLD AUTO: 277 10*3/MM3 (ref 140–450)
PMV BLD AUTO: 10.5 FL (ref 6–12)
POTASSIUM SERPL-SCNC: 4.1 MMOL/L (ref 3.5–5.2)
PROT SERPL-MCNC: 7.4 G/DL (ref 6–8.5)
RBC # BLD AUTO: 4.49 10*6/MM3 (ref 4.14–5.8)
SODIUM SERPL-SCNC: 138 MMOL/L (ref 136–145)
WBC NRBC COR # BLD: 8.56 10*3/MM3 (ref 3.4–10.8)

## 2022-06-21 PROCEDURE — 96366 THER/PROPH/DIAG IV INF ADDON: CPT

## 2022-06-21 PROCEDURE — 25010000002 VANCOMYCIN 5 G RECONSTITUTED SOLUTION: Performed by: HOSPITALIST

## 2022-06-21 PROCEDURE — 84100 ASSAY OF PHOSPHORUS: CPT | Performed by: FAMILY MEDICINE

## 2022-06-21 PROCEDURE — 83735 ASSAY OF MAGNESIUM: CPT | Performed by: FAMILY MEDICINE

## 2022-06-21 PROCEDURE — 85025 COMPLETE CBC W/AUTO DIFF WBC: CPT | Performed by: FAMILY MEDICINE

## 2022-06-21 PROCEDURE — 99217 PR OBSERVATION CARE DISCHARGE MANAGEMENT: CPT | Performed by: FAMILY MEDICINE

## 2022-06-21 PROCEDURE — G0378 HOSPITAL OBSERVATION PER HR: HCPCS

## 2022-06-21 PROCEDURE — 80076 HEPATIC FUNCTION PANEL: CPT | Performed by: FAMILY MEDICINE

## 2022-06-21 PROCEDURE — 94799 UNLISTED PULMONARY SVC/PX: CPT

## 2022-06-21 PROCEDURE — 80048 BASIC METABOLIC PNL TOTAL CA: CPT | Performed by: FAMILY MEDICINE

## 2022-06-21 PROCEDURE — 25010000002 CEFEPIME PER 500 MG: Performed by: HOSPITALIST

## 2022-06-21 RX ORDER — DOXYCYCLINE HYCLATE 100 MG/1
100 CAPSULE ORAL 2 TIMES DAILY
Qty: 28 CAPSULE | Refills: 0 | Status: SHIPPED | OUTPATIENT
Start: 2022-06-21 | End: 2022-07-05

## 2022-06-21 RX ADMIN — CEFEPIME HYDROCHLORIDE 1 G: 1 INJECTION, POWDER, FOR SOLUTION INTRAMUSCULAR; INTRAVENOUS at 08:19

## 2022-06-21 RX ADMIN — LORAZEPAM 0.5 MG: 0.5 TABLET ORAL at 12:38

## 2022-06-21 RX ADMIN — HYDROCODONE BITARTRATE AND ACETAMINOPHEN 1 TABLET: 5; 325 TABLET ORAL at 08:50

## 2022-06-21 RX ADMIN — HYDROCODONE BITARTRATE AND ACETAMINOPHEN 1 TABLET: 5; 325 TABLET ORAL at 12:38

## 2022-06-21 RX ADMIN — NICOTINE 1 PATCH: 21 PATCH, EXTENDED RELEASE TRANSDERMAL at 08:19

## 2022-06-21 RX ADMIN — LORAZEPAM 0.5 MG: 0.5 TABLET ORAL at 04:10

## 2022-06-21 RX ADMIN — HYDROCODONE BITARTRATE AND ACETAMINOPHEN 1 TABLET: 5; 325 TABLET ORAL at 04:10

## 2022-06-21 RX ADMIN — CEFEPIME HYDROCHLORIDE 1 G: 1 INJECTION, POWDER, FOR SOLUTION INTRAMUSCULAR; INTRAVENOUS at 01:23

## 2022-06-21 RX ADMIN — VANCOMYCIN HYDROCHLORIDE 1750 MG: 5 INJECTION, POWDER, LYOPHILIZED, FOR SOLUTION INTRAVENOUS at 09:43

## 2022-06-21 RX ADMIN — CARIPRAZINE 3 MG: 1.5 CAPSULE, GELATIN COATED ORAL at 08:19

## 2022-06-21 NOTE — OUTREACH NOTE
Prep Survey    Flowsheet Row Responses   [facility] patient discharged from? [facility]   Is LACE score < 7 ? Yes   Emergency Room discharge w/ pulse ox? No   Eligibility [facility]   Date of Admission 06/18/22   Date of Discharge 06/21/22   Discharge Disposition Home or Self Care   Discharge diagnosis Sepsis without acute organ dysfunction,  IV drug abuse   Does the patient have one of the following disease processes/diagnoses(primary or secondary)? Sepsis   Does the patient have Home health ordered? No   Is there a DME ordered? No   Prep survey completed? Yes          SAMANTHA MARAVILLA - Registered Nurse

## 2022-06-21 NOTE — DISCHARGE SUMMARY
[facility]         HOSPITALIST  DISCHARGE SUMMARY    Patient Name: Darci Kumar  : 1990  MRN: [id number]    Date of Admission: 2022  Date of Discharge:  2022    Primary Care Physician: Eliazar Maier MD    Consults     No orders found from 2022 to 2022.          Active and Resolved Hospital Problems:   Left lower extremity cellulitis with abscess status post I&D, infection secondary to MRSA  Bipolar disorder  IV drug abuse  Sepsis secondary to cellulitis  Lactic acidosis  Metabolic acidosis       Active Hospital Problems    Diagnosis POA   • MRSA cellulitis [L03.90, B95.62] Yes   • Sepsis (HCC) [A41.9] Yes      Resolved Hospital Problems   No resolved problems to display.       Hospital Course     Hospital Course:  31-year-old male with history of bipolar disorder, IV drug abuse, hospitalized on 2022 with left lower extremity cellulitis, failed outpatient therapy, hospitalized for further monitoring and management, detected MRSA from his wound culture, he is on vancomycin until we have sensitivities, bedside ultrasound showed abscess, status post I&D at bedside in the emergency room, wound cultures grew out MRSA, he is kept on vancomycin with positive response, cellulitis progressed rapidly, sensitivities show MRSA was sensitive to tetracyclines, discharged in hemodynamically stable condition on 2022 with 14-day supply of doxycycline, patient given the option of returning daily for ONS dressing changes, he notes that he has a relative that works in [facility] and requested to follow-up with them as outpatient which he would arrange daily dressing changes as they were a family member.  Wound care clinic referral provided to ensure follow-up care and monitoring    Day of Discharge     Vital Signs:  Temp:  [97.7 °F (36.5 °C)-98.5 °F (36.9 °C)] 97.7 °F (36.5 °C)  Heart Rate:  [74-93] 93  Resp:  [16-18] 18  BP: (122-135)/(69-91) 135/91  Review of  systems:  All systems reviewed and negative except for left lower extremity pain, fatigue    Physical Exam                         Constitutional: Awake, alert, no acute distress              Eyes: Pupils equal, sclerae anicteric, no conjunctival injection              HENT: NCAT, mucous membranes moist              Neck: Supple, full range of motion              Respiratory: Clear to auscultation bilaterally, nonlabored respirations               Cardiovascular: RRR, no murmurs, rubs, or gallops, palpable pedal pulses bilaterally              Gastrointestinal: Positive bowel sounds, soft, nontender, nondistended              Musculoskeletal: No bilateral ankle edema, no clubbing or cyanosis to extremities              Psychiatric: Appropriate affect, cooperative              Neurologic: Oriented x 3, strength symmetric in all extremities, Cranial Nerves grossly intact to confrontation, speech clear              Skin: No rashes, left lower extremity with significant improved erythema, dressed, tender with swelling, drainage through gauze dressing        Discharge Details        Discharge Medications      New Medications      Instructions Start Date   doxycycline 100 MG capsule  Commonly known as: VIBRAMYCIN   100 mg, Oral, 2 Times Daily         Continue These Medications      Instructions Start Date   Vraylar 3 MG capsule capsule  Generic drug: Cariprazine HCl   3 mg, Oral, Daily             No Known Allergies    Discharge Disposition:  Home or Self Care    Diet:  Hospital:No active diet order      Discharge Activity: as tolerates      CODE STATUS:  Code Status and Medical Interventions:   Ordered at: 06/18/22 2007     Level Of Support Discussed With:    Patient     Code Status (Patient has no pulse and is not breathing):    CPR (Attempt to Resuscitate)     Medical Interventions (Patient has pulse or is breathing):    Full Support         Future Appointments   Date Time Provider Department Center   6/22/2022  4:30  PM Eliazar Maier MD [facility]       Additional Instructions for the Follow-ups that You Need to Schedule     Discharge Follow-up with PCP   As directed       Currently Documented PCP:    Eliazar Maier MD    PCP Phone Number:    [phone]     Follow Up Details: 3 to 7 days         Referral to Wound Clinic   As directed     Additional information on Labs and Follow-ups:    Please follow up with PCP, Dr. Maier, tomorrow 6/22/22 at 4:30 pm. Call [phone] to reschedule if this appointment will not work for you.            Pertinent  and/or Most Recent Results     PROCEDURES:   Adult Transthoracic Echo Complete W/ Cont if Necessary Per Protocol    Result Date: 6/20/2022  · Calculated left ventricular EF = 51.5% Estimated left ventricular EF was in agreement with the calculated left ventricular EF.      XR Tibia Fibula 2 View Left    Result Date: 6/18/2022  PROCEDURE: XR TIBIA FIBULA 2 VW LEFT  COMPARISON: None.  INDICATIONS: infection; eval FB vs soft tissue gas in anterior lower leg  FINDINGS: Two views were obtained.  No acute fracture or acute malalignment is identified.  No subcutaneous emphysema.  No retained radiopaque foreign body.  Soft tissue swelling is seen.  No radiographic evidence of osteomyelitis.  If symptoms or clinical concerns persist, consider imaging follow-up.       No acute fracture or acute malalignment is identified.     COMMENT:  Part of this note is an electronic transcription of spoken language to printed text. The electronic translation/transcription may permit erroneous, or at times, nonsensical (or even sensical) words or phrases to be inadvertently transcribed or omitted; this  has reviewed the note for such errors (as well as additional errors); however, some may still exist.  SALOMÓN LOMELI JR, MD       Electronically Signed and Approved By: SALOMÓN LOMELI JR, MD on 6/18/2022 at 19:22                LAB RESULTS:      Lab 06/21/22  0507  06/18/22  1943 06/18/22  1636 06/18/22  1635   WBC 8.56  --   --  17.75*   HEMOGLOBIN 13.9  --   --  12.0*   HEMATOCRIT 40.6  --   --  37.1*   PLATELETS 277  --   --  141   NEUTROS ABS 5.76  --   --  14.70*   IMMATURE GRANS (ABS) 0.02  --   --  0.06*   LYMPHS ABS 1.69  --   --  1.61   MONOS ABS 0.69  --   --  1.22*   EOS ABS 0.34  --   --  0.10   MCV 90.4  --   --  94.9   LACTATE  --  1.4 3.4*  --          Lab 06/21/22  0507 06/20/22  0719 06/18/22 2052 06/18/22  1635   SODIUM 138  --   --  137   POTASSIUM 4.1  --   --  3.8   CHLORIDE 102  --   --  101   CO2 23.3  --   --  21.9*   ANION GAP 12.7  --   --  14.1   BUN 9  --   --  8   CREATININE 0.85  --   --  1.00   EGFR 117.7  --   --  101.9   GLUCOSE 141*  --   --  82   CALCIUM 9.8  --   --  9.0   MAGNESIUM 2.1 1.9 2.0  --    PHOSPHORUS 3.8 3.5 2.5  --          Lab 06/21/22  0507 06/18/22  1635   TOTAL PROTEIN 7.4 6.8   ALBUMIN 4.00 3.90   GLOBULIN  --  2.9   ALT (SGPT) 36 15   AST (SGOT) 24 18   BILIRUBIN 0.2 0.3   BILIRUBIN DIRECT <0.2  --    ALK PHOS 97 87                     Brief Urine Lab Results  (Last result in the past 365 days)      Color   Clarity   Blood   Leuk Est   Nitrite   Protein   CREAT   Urine HCG        08/30/21 1625 Yellow   Clear   Negative   Negative   Negative   Negative               Microbiology Results (last 10 days)     Procedure Component Value - Date/Time    MRSA Screen, PCR (Inpatient) - Swab, Nares [[id number]]  (Abnormal) Collected: 06/18/22 2310    Lab Status: Final result Specimen: Swab from Nares Updated: 06/19/22 0047     MRSA PCR MRSA Detected    Narrative:      The negative predictive value of this diagnostic test is high and should only be used to consider de-escalating anti-MRSA therapy. A positive result may indicate colonization with MRSA and must be correlated clinically.    Wound Culture - Wound, Leg, Left [[id number]]  (Abnormal) Collected: 06/18/22 2030    Lab Status: Final result Specimen: Wound from Leg, Left Updated:  06/20/22 0921     Wound Culture Staphylococcus aureus, MRSA     Comment: Methicillin resistant Staphylococcus aureus, Patient may be an isolation risk.        Gram Stain No organisms seen    Narrative:      For MICs refer to Wound culture Collected 6/18/2022 1635.     Blood Culture - Blood, Hand, Right [[id number]]  (Normal) Collected: 06/18/22 1635    Lab Status: Preliminary result Specimen: Blood from Hand, Right Updated: 06/20/22 1702     Blood Culture No growth at 2 days    Wound Culture - Wound, Leg, Left [[id number]]  (Abnormal)  (Susceptibility) Collected: 06/18/22 1635    Lab Status: Final result Specimen: Wound from Leg, Left Updated: 06/20/22 0819     Wound Culture Light growth (2+) Staphylococcus aureus, MRSA     Comment: Methicillin resistant Staphylococcus aureus, Patient may be an isolation risk.        Gram Stain Few (2+) Gram positive cocci in pairs, chains and clusters    Susceptibility      Staphylococcus aureus, MRSA      JUDD      Clindamycin Susceptible      Erythromycin Resistant     Inducible Clindamycin Resistance Negative      Oxacillin Resistant     Rifampin Susceptible      Tetracycline Susceptible      Trimethoprim + Sulfamethoxazole Susceptible      Vancomycin Susceptible                       Susceptibility Comments     Staphylococcus aureus, MRSA    This isolate does not demonstrate inducible clindamycin resistance in vitro.                     XR Tibia Fibula 2 View Left    Result Date: 6/18/2022  Impression:  No acute fracture or acute malalignment is identified.     COMMENT:  Part of this note is an electronic transcription of spoken language to printed text. The electronic translation/transcription may permit erroneous, or at times, nonsensical (or even sensical) words or phrases to be inadvertently transcribed or omitted; this  has reviewed the note for such errors (as well as additional errors); however, some may still exist.  SALOMÓN LOMELI JR, MD       Electronically  Signed and Approved By: SALOMÓN LOMELI JR, MD on 6/18/2022 at 19:22                        Results for orders placed during the hospital encounter of 06/18/22    Adult Transthoracic Echo Complete W/ Cont if Necessary Per Protocol    Interpretation Summary  · Calculated left ventricular EF = 51.5% Estimated left ventricular EF was in agreement with the calculated left ventricular EF.      Labs Pending at Discharge:  Pending Labs     Order Current Status    Blood Culture - Blood, Hand, Right Preliminary result            Time spent on Discharge including face to face service:  35 minutes    Electronically signed by Alcides Harmon MD, 06/21/22, 4:59 PM EDT.

## 2022-06-21 NOTE — DISCHARGE INSTR - LAB
Please follow up with PCP, Dr. Maier, tomorrow 6/22/22 at 4:30 pm. Call [phone] to reschedule if this appointment will not work for you.

## 2022-06-21 NOTE — CONSULTS
Discharge Planning Assessment  [facility]     Patient Name: Darci Kumar  MRN: [id number]  Today's Date: 6/21/2022    Admit Date: 6/18/2022     Discharge Needs Assessment     Row Name 06/21/22 1313       Living Environment    People in Home significant other    Name(s) of People in Home Pt lives in [place] with a friend/SO    Current Living Arrangements home    Primary Care Provided by self    Provides Primary Care For no one    Family Caregiver if Needed friend(s)    Quality of Family Relationships supportive    Able to Return to Prior Arrangements yes       Resource/Environmental Concerns    Resource/Environmental Concerns none       Transition Planning    Patient/Family Anticipates Transition to home    Patient/Family Anticipated Services at Transition outpatient care;durable medical equipment    Transportation Anticipated family or friend will provide       Discharge Needs Assessment    Readmission Within the Last 30 Days no previous admission in last 30 days    Equipment Currently Used at Home none    Concerns to be Addressed basic needs;discharge planning    Anticipated Changes Related to Illness none    Equipment Needed After Discharge crutches               Discharge Plan     Row Name 06/21/22 7957       Plan    Plan SW spoke with pt regarding discharge plans. Pt plans on returning home, independently at this time. Pt lives with a friend/SO who is able to assist as needed. Discussed wound care with pt who states he has a relative at [facility] in [place] who is going to assist with wound care. Pt also requesting crutches- [employer] notified. Pt denies any other needs/resources at this time and plans to discharge home today.    Final Discharge Disposition Code 01 - home or self-care              Continued Care and Services - Admitted Since 6/18/2022    Coordination has not been started for this encounter.          Demographic Summary     Row Name 06/21/22 8048       General Information    Admission Type  observation    Arrived From emergency department    Referral Source admission list    Reason for Consult discharge planning    Preferred Language English               Functional Status     Row Name 06/21/22 1309       Functional Status    Usual Activity Tolerance good       Functional Status, IADL    Medications independent    Meal Preparation independent    Housekeeping independent    Laundry independent    Shopping independent               Psychosocial     Row Name 06/21/22 1313       Behavior WDL    Behavior WDL WDL       Emotion Mood WDL    Emotion/Mood/Affect WDL WDL       Speech WDL    Speech WDL WDL       Perceptual State WDL    Perceptual State WDL WDL       Thought Process WDL    Thought Process WDL WDL       Intellectual Performance WDL    Intellectual Performance WDL WDL       Coping/Stress    Major Change/Loss/Stressor none    Sources of Support friend(s)    Techniques to [place] with Loss/Stress/Change not applicable    Reaction to Health Status accepting    Understanding of Condition and Treatment adequate understanding of medical condition       Developmental Stage (Eriksson's)    Developmental Stage Stage 6 (18-35 years/Young Adulthood) Intimacy vs. Isolation                ADRIANNA Sanches

## 2022-06-21 NOTE — PLAN OF CARE
Goal Outcome Evaluation:           Progress: no change  Outcome Evaluation: VSS. Norco given x2 for left leg pain 7/10 and Ativan given x2 for anxiety. Patient refused dressing change, education provided. No further complaints.

## 2022-06-22 ENCOUNTER — TRANSITIONAL CARE MANAGEMENT TELEPHONE ENCOUNTER (OUTPATIENT)
Dept: CALL CENTER | Facility: HOSPITAL | Age: 32
End: 2022-06-22

## 2022-06-22 NOTE — OUTREACH NOTE
Call Center TCM Note    Flowsheet Row Responses   [facility] facility patient discharged from? [facility]   Does the patient have one of the following disease processes/diagnoses(primary or secondary)? Sepsis   TCM attempt successful? No   Unsuccessful attempts Attempt 2          Merlene Plascencia RN    6/22/2022, 13:12 EDT

## 2022-06-22 NOTE — OUTREACH NOTE
Call Center TCM Note    Flowsheet Row Responses   [facility] facility patient discharged from? [facility]   Does the patient have one of the following disease processes/diagnoses(primary or secondary)? Sepsis   TCM attempt successful? No   Unsuccessful attempts Attempt 1          Merlene Plsacencia RN    6/22/2022, 10:55 EDT

## 2022-06-23 ENCOUNTER — TRANSITIONAL CARE MANAGEMENT TELEPHONE ENCOUNTER (OUTPATIENT)
Dept: CALL CENTER | Facility: HOSPITAL | Age: 32
End: 2022-06-23

## 2022-06-23 LAB — BACTERIA SPEC AEROBE CULT: NORMAL

## 2022-06-23 NOTE — OUTREACH NOTE
Call Center TCM Note    Flowsheet Row Responses   [facility] facility patient discharged from? [facility]   Does the patient have one of the following disease processes/diagnoses(primary or secondary)? Sepsis   TCM attempt successful? No   Unsuccessful attempts Attempt 3          Mckenna Ramon RN    6/23/2022, 15:10 EDT

## 2022-06-24 NOTE — PROGRESS NOTES
LM FOR PT TO CALL BACK. TRIED CALLING OTHER NUMBER ON CHART AND IT WAS NOT THE PT SO I REMOVED IT.

## 2025-04-16 LAB
ALBUMIN SERPL-MCNC: 4 G/DL (ref 3.5–5.2)
ALBUMIN/GLOB SERPL: 1.1 G/DL
ALP SERPL-CCNC: 93 U/L (ref 39–117)
ALT SERPL W P-5'-P-CCNC: 45 U/L (ref 1–41)
ANION GAP SERPL CALCULATED.3IONS-SCNC: 12.4 MMOL/L (ref 5–15)
AST SERPL-CCNC: 32 U/L (ref 1–40)
BASOPHILS # BLD AUTO: 0.07 10*3/MM3 (ref 0–0.2)
BASOPHILS NFR BLD AUTO: 0.5 % (ref 0–1.5)
BILIRUB SERPL-MCNC: 0.3 MG/DL (ref 0–1.2)
BUN SERPL-MCNC: 9 MG/DL (ref 6–20)
BUN/CREAT SERPL: 8.6 (ref 7–25)
CALCIUM SPEC-SCNC: 9.3 MG/DL (ref 8.6–10.5)
CHLORIDE SERPL-SCNC: 100 MMOL/L (ref 98–107)
CO2 SERPL-SCNC: 26.6 MMOL/L (ref 22–29)
CREAT SERPL-MCNC: 1.05 MG/DL (ref 0.76–1.27)
D-LACTATE SERPL-SCNC: 1.7 MMOL/L (ref 0.5–2)
DEPRECATED RDW RBC AUTO: 42.8 FL (ref 37–54)
EGFRCR SERPLBLD CKD-EPI 2021: 94.3 ML/MIN/1.73
EOSINOPHIL # BLD AUTO: 0 10*3/MM3 (ref 0–0.4)
EOSINOPHIL NFR BLD AUTO: 0 % (ref 0.3–6.2)
ERYTHROCYTE [DISTWIDTH] IN BLOOD BY AUTOMATED COUNT: 12.3 % (ref 12.3–15.4)
GLOBULIN UR ELPH-MCNC: 3.5 GM/DL
GLUCOSE SERPL-MCNC: 131 MG/DL (ref 65–99)
HCT VFR BLD AUTO: 48 % (ref 37.5–51)
HGB BLD-MCNC: 15.6 G/DL (ref 13–17.7)
HIV 1+2 AB+HIV1P24 AG SERPLBLD IA.RAPID: NORMAL
HIV 1+2 AB+HIV1P24 AG SERPLBLD IA.RAPID: NORMAL
IMM GRANULOCYTES # BLD AUTO: 0.06 10*3/MM3 (ref 0–0.05)
IMM GRANULOCYTES NFR BLD AUTO: 0.5 % (ref 0–0.5)
LYMPHOCYTES # BLD AUTO: 1.74 10*3/MM3 (ref 0.7–3.1)
LYMPHOCYTES NFR BLD AUTO: 13.7 % (ref 19.6–45.3)
MCH RBC QN AUTO: 30.6 PG (ref 26.6–33)
MCHC RBC AUTO-ENTMCNC: 32.5 G/DL (ref 31.5–35.7)
MCV RBC AUTO: 94.1 FL (ref 79–97)
MONOCYTES # BLD AUTO: 0.73 10*3/MM3 (ref 0.1–0.9)
MONOCYTES NFR BLD AUTO: 5.7 % (ref 5–12)
NEUTROPHILS NFR BLD AUTO: 10.13 10*3/MM3 (ref 1.7–7)
NEUTROPHILS NFR BLD AUTO: 79.6 % (ref 42.7–76)
NRBC BLD AUTO-RTO: 0 /100 WBC (ref 0–0.2)
PLATELET # BLD AUTO: 192 10*3/MM3 (ref 140–450)
PMV BLD AUTO: 11 FL (ref 6–12)
POTASSIUM SERPL-SCNC: 3.9 MMOL/L (ref 3.5–5.2)
PROT SERPL-MCNC: 7.5 G/DL (ref 6–8.5)
RBC # BLD AUTO: 5.1 10*6/MM3 (ref 4.14–5.8)
SODIUM SERPL-SCNC: 139 MMOL/L (ref 136–145)
TREPONEMA PALLIDUM IGG+IGM AB [PRESENCE] IN SERUM OR PLASMA BY IMMUNOASSAY: REACTIVE
WBC NRBC COR # BLD AUTO: 12.73 10*3/MM3 (ref 3.4–10.8)
WHOLE BLOOD HOLD COAG: NORMAL

## 2025-04-16 PROCEDURE — 80053 COMPREHEN METABOLIC PANEL: CPT | Performed by: EMERGENCY MEDICINE

## 2025-04-16 PROCEDURE — 87040 BLOOD CULTURE FOR BACTERIA: CPT | Performed by: EMERGENCY MEDICINE

## 2025-04-16 PROCEDURE — 86593 SYPHILIS TEST NON-TREP QUANT: CPT

## 2025-04-16 PROCEDURE — 86592 SYPHILIS TEST NON-TREP QUAL: CPT

## 2025-04-16 PROCEDURE — 86780 TREPONEMA PALLIDUM: CPT

## 2025-04-16 PROCEDURE — 83605 ASSAY OF LACTIC ACID: CPT | Performed by: EMERGENCY MEDICINE

## 2025-04-16 PROCEDURE — 99283 EMERGENCY DEPT VISIT LOW MDM: CPT

## 2025-04-16 PROCEDURE — G0475 HIV COMBINATION ASSAY: HCPCS

## 2025-04-16 PROCEDURE — 36415 COLL VENOUS BLD VENIPUNCTURE: CPT | Performed by: EMERGENCY MEDICINE

## 2025-04-16 PROCEDURE — 85025 COMPLETE CBC W/AUTO DIFF WBC: CPT | Performed by: EMERGENCY MEDICINE

## 2025-04-17 ENCOUNTER — HOSPITAL ENCOUNTER (EMERGENCY)
Facility: HOSPITAL | Age: 35
Discharge: HOME | End: 2025-04-17
Attending: EMERGENCY MEDICINE
Payer: COMMERCIAL

## 2025-04-17 VITALS
TEMPERATURE: 98.2 F | HEART RATE: 103 BPM | RESPIRATION RATE: 17 BRPM | DIASTOLIC BLOOD PRESSURE: 82 MMHG | HEIGHT: 71 IN | OXYGEN SATURATION: 98 % | SYSTOLIC BLOOD PRESSURE: 141 MMHG | WEIGHT: 210.76 LBS | BODY MASS INDEX: 29.51 KG/M2

## 2025-04-17 DIAGNOSIS — A53.9 SYPHILIS: ICD-10-CM

## 2025-04-17 DIAGNOSIS — L03.116 CELLULITIS OF LEFT LOWER EXTREMITY: Primary | ICD-10-CM

## 2025-04-17 LAB
RPR SER QL: REACTIVE
RPR SER-TITR: ABNORMAL {TITER}

## 2025-04-17 RX ORDER — IBUPROFEN 400 MG/1
800 TABLET, FILM COATED ORAL ONCE
Status: COMPLETED | OUTPATIENT
Start: 2025-04-17 | End: 2025-04-17

## 2025-04-17 RX ORDER — DOXYCYCLINE 100 MG/1
100 CAPSULE ORAL 2 TIMES DAILY
Qty: 28 CAPSULE | Refills: 0 | Status: SHIPPED | OUTPATIENT
Start: 2025-04-17 | End: 2025-05-01

## 2025-04-17 RX ORDER — SULFAMETHOXAZOLE AND TRIMETHOPRIM 800; 160 MG/1; MG/1
1 TABLET ORAL ONCE
Status: COMPLETED | OUTPATIENT
Start: 2025-04-17 | End: 2025-04-17

## 2025-04-17 RX ADMIN — IBUPROFEN 800 MG: 400 TABLET, FILM COATED ORAL at 02:24

## 2025-04-17 RX ADMIN — SULFAMETHOXAZOLE AND TRIMETHOPRIM 1 TABLET: 800; 160 TABLET ORAL at 02:24

## 2025-04-17 NOTE — DISCHARGE INSTRUCTIONS
You tested positive for syphilis. Your exam and symptoms are also consistent with cellulitis. You are being discharged home with doxycycline. This will cover MRSA and syphilis.    You are also being discharged home with ibuprofen for pain and fever.     Follow up with your Primary Care Provider in 3-5 days especially if symptoms worsen.    Return to the Emergency Department if you develop any uncontrollable fever, intractable pain, nausea, vomiting.

## 2025-04-17 NOTE — ED PROVIDER NOTES
Time: 10:37 PM EDT  Date of encounter:  4/16/2025  Independent Historian/Clinical History and Information was obtained by:   Patient    History is limited by: N/A    Chief Complaint   Patient presents with    Leg Pain         History of Present Illness:  Patient is a 34 y.o. year old male who presents to the emergency department for evaluation of left lower leg pain, swelling, erythema that started earlier today.  Patient also reports fevers and chills.  He reports he was seen in an ER yesterday in [place] and diagnosed with ear infections, was started on cefdinir.  Patient reports history of MRSA in the left lower leg and is concerned for infection.  Patient also reports he believes he may have been exposed to syphilis and would like to be tested for STIs. Denies any penile discharge. (VICTORIANO Yee, provider in triage)     Patient Care Team  Primary Care Provider: Eliazar Maier MD    Past Medical History:     No Known Allergies  Past Medical History:   Diagnosis Date    Bipolar disorder      History reviewed. No pertinent surgical history.  History reviewed. No pertinent family history.    Home Medications:  Prior to Admission medications    Medication Sig Start Date End Date Taking? Authorizing Provider   Vraylar 3 MG capsule capsule Take 3 mg by mouth Daily. 4/13/22   Provider, MD Fady        Social History:   Social History     Tobacco Use    Smoking status: Every Day     Current packs/day: 1.50     Average packs/day: 1.5 packs/day for 20.0 years (30.0 ttl pk-yrs)     Types: Cigarettes    Smokeless tobacco: Never   Vaping Use    Vaping status: Unknown   Substance Use Topics    Alcohol use: Not Currently    Drug use: Yes     Types: IV     Comment: LAST USED 3 DAYS         Review of Systems:  Review of Systems   Constitutional:  Positive for fever. Negative for chills.   HENT:  Negative for congestion and ear pain.    Eyes:  Negative for pain.   Respiratory:  Negative for cough and  "shortness of breath.    Cardiovascular:  Negative for chest pain.   Gastrointestinal:  Negative for abdominal pain, diarrhea, nausea and vomiting.   Genitourinary:  Negative for dysuria and hematuria.   Musculoskeletal:  Negative for myalgias.   Skin:  Positive for rash.   Neurological:  Negative for dizziness and headaches.        Physical Exam:  /74   Pulse 104   Temp 98.7 °F (37.1 °C) (Oral)   Resp 20   Ht 180.3 cm (71\")   Wt 95.6 kg (210 lb 12.2 oz)   SpO2 97%   BMI 29.40 kg/m²         Physical Exam  Constitutional:       Appearance: Normal appearance.   HENT:      Head: Normocephalic.   Eyes:      Extraocular Movements: Extraocular movements intact.      Conjunctiva/sclera: Conjunctivae normal.   Pulmonary:      Effort: Pulmonary effort is normal.   Abdominal:      General: There is no distension.   Skin:     General: Skin is warm.      Coloration: Skin is not cyanotic.      Comments: Redness to the left lower leg. Suspicious for cellulitis.   Neurological:      Mental Status: He is alert and oriented to person, place, and time.   Psychiatric:         Attention and Perception: Attention and perception normal.         Mood and Affect: Mood normal.                            Medical Decision Making:      Comorbidities that affect care:    None    External Notes reviewed:    None      The following orders were placed and all results were independently analyzed by me:  Orders Placed This Encounter   Procedures    Blood Culture - Blood,    Comprehensive Metabolic Panel    Lactic Acid, Plasma    CBC Auto Differential    Treponema pallidum AB w/Reflex RPR    HIV-1 & HIV-2 Antibodies    HIV-1 / O / 2 Ag / Antibody    RPR    CBC & Differential    Extra Tubes    Light Blue Top       Medications Given in the Emergency Department:  Medications   sulfamethoxazole-trimethoprim (BACTRIM DS,SEPTRA DS) 800-160 MG per tablet 1 tablet (1 tablet Oral Given 4/17/25 0224)   ibuprofen (ADVIL,MOTRIN) tablet 800 mg (800 mg " Oral Given 4/17/25 0224)        ED Course:    The patient was initially evaluated in the triage area where orders were placed. The patient was later dispositioned by ANTON Rangel.      The patient was advised to stay for completion of workup which includes but is not limited to communication of labs and radiological results, reassessment and plan. The patient was advised that leaving prior to disposition by a provider could result in critical findings that are not communicated to the patient.     ED Course as of 04/17/25 0249   Thu Apr 17, 2025   0206 Repeat temp 100.2F [MV]      ED Course User Index  [MV] Varghese Maza PA       Labs:    Lab Results (last 24 hours)       Procedure Component Value Units Date/Time    CBC & Differential [[id number]]  (Abnormal) Collected: 04/16/25 2239    Specimen: Blood from Arm, Right Updated: 04/16/25 2247    Narrative:      The following orders were created for panel order CBC & Differential.  Procedure                               Abnormality         Status                     ---------                               -----------         ------                     CBC Auto Differential[[id number]]        Abnormal            Final result                 Please view results for these tests on the individual orders.    Comprehensive Metabolic Panel [[id number]]  (Abnormal) Collected: 04/16/25 2239    Specimen: Blood from Arm, Right Updated: 04/16/25 2312     Glucose 131 mg/dL      BUN 9 mg/dL      Creatinine 1.05 mg/dL      Sodium 139 mmol/L      Potassium 3.9 mmol/L      Chloride 100 mmol/L      CO2 26.6 mmol/L      Calcium 9.3 mg/dL      Total Protein 7.5 g/dL      Albumin 4.0 g/dL      ALT (SGPT) 45 U/L      AST (SGOT) 32 U/L      Alkaline Phosphatase 93 U/L      Total Bilirubin 0.3 mg/dL      Globulin 3.5 gm/dL      A/G Ratio 1.1 g/dL      BUN/Creatinine Ratio 8.6     Anion Gap 12.4 mmol/L      eGFR 94.3 mL/min/1.73     Narrative:      GFR Categories in Chronic Kidney Disease  (CKD)      GFR Category          GFR (mL/min/1.73)    Interpretation  G1                     90 or greater         Normal or high (1)  G2                      60-89                Mild decrease (1)  G3a                   45-59                Mild to moderate decrease  G3b                   30-44                Moderate to severe decrease  G4                    15-29                Severe decrease  G5                    14 or less           Kidney failure          (1)In the absence of evidence of kidney disease, neither GFR category G1 or G2 fulfill the criteria for CKD.    eGFR calculation 2021 CKD-EPI creatinine equation, which does not include race as a factor    Lactic Acid, Plasma [[id number]]  (Normal) Collected: 04/16/25 2239    Specimen: Blood from Arm, Right Updated: 04/16/25 2310     Lactate 1.7 mmol/L     CBC Auto Differential [[id number]]  (Abnormal) Collected: 04/16/25 2239    Specimen: Blood from Arm, Right Updated: 04/16/25 2247     WBC 12.73 10*3/mm3      RBC 5.10 10*6/mm3      Hemoglobin 15.6 g/dL      Hematocrit 48.0 %      MCV 94.1 fL      MCH 30.6 pg      MCHC 32.5 g/dL      RDW 12.3 %      RDW-SD 42.8 fl      MPV 11.0 fL      Platelets 192 10*3/mm3      Neutrophil % 79.6 %      Lymphocyte % 13.7 %      Monocyte % 5.7 %      Eosinophil % 0.0 %      Basophil % 0.5 %      Immature Grans % 0.5 %      Neutrophils, Absolute 10.13 10*3/mm3      Lymphocytes, Absolute 1.74 10*3/mm3      Monocytes, Absolute 0.73 10*3/mm3      Eosinophils, Absolute 0.00 10*3/mm3      Basophils, Absolute 0.07 10*3/mm3      Immature Grans, Absolute 0.06 10*3/mm3      nRBC 0.0 /100 WBC     Blood Culture - Blood, Arm, Right [[id number]] Collected: 04/16/25 2239    Specimen: Blood from Arm, Right Updated: 04/16/25 2250    Treponema pallidum AB w/Reflex RPR [[id number]]  (Abnormal) Collected: 04/16/25 2241    Specimen: Blood from Arm, Right Updated: 04/16/25 2331     Treponemal AB Total Reactive    Narrative:      Reactive results  will reflex RPR testing.    HIV-1 & HIV-2 Antibodies [[id number]]  (Normal) Collected: 04/16/25 2241    Specimen: Blood from Arm, Right Updated: 04/16/25 2330    Narrative:      The following orders were created for panel order HIV-1 & HIV-2 Antibodies.  Procedure                               Abnormality         Status                     ---------                               -----------         ------                     HIV-1 / O / 2 Ag / Antibody[[id number]]  Normal              Final result                 Please view results for these tests on the individual orders.    HIV-1 / O / 2 Ag / Antibody [[id number]]  (Normal) Collected: 04/16/25 2241    Specimen: Blood from Arm, Right Updated: 04/16/25 2330     HIV-1/ HIV-2 Ab Non-Reactive     HIV-1 P24 Ag Screen Non-Reactive    Narrative:      Detection of p24 may be inhibited by biotin in the sample, causing false negative results in acute infection. Therefore, do not test samples from patients who are taking biotin  Detection of p24 may be inhibited by biotin in the sample, causing false negative results in acute infection. Therefore, do not test samples from patients who are taking biotin    RPR [[id number]] Collected: 04/16/25 2241    Specimen: Blood from Arm, Right Updated: 04/16/25 2325             Imaging:    No Radiology Exams Resulted Within Past 24 Hours      Differential Diagnosis and Discussion:      Rash: Differential diagnosis includes but is not limited to sepsis, cellulitis, [place] Spotted Fever, meningitis, meningococcemia, Varicella, Strep infection, dermatitis, allergic reaction, Lyme disease, and toxic shock syndrome.    PROCEDURES:    Labs were collected in the emergency department and all labs were reviewed and interpreted by me.    No orders to display        Procedures    MDM     Amount and/or Complexity of Data Reviewed  Clinical lab tests: reviewed    Risk of Complications, Morbidity, and/or Mortality  Presenting problems:  moderate  Diagnostic procedures: low  Management options: low    Patient Progress  Patient progress: stable    Patient presents to the emergency department for evaluation of left lower leg pain, swelling, erythema that started earlier today.  Patient also reports fevers and chills.  He reports he was seen in an ER yesterday in [place] and diagnosed with ear infections, was started on cefdinir.  Patient reports history of MRSA in the left lower leg and is concerned for infection.  Patient also reports he believes he may have been exposed to syphilis and would like to be tested for STIs. Denies any penile discharge.    CBC shows a mildly elevated WBC of 12.73. Rest of the CBC is unremarkable.  The patient´s CMP that was reviewed and interpreted by me shows no abnormalities of critical concern. Of note, the patient´s sodium and potassium are acceptable. The patient´s liver enzymes are unremarkable. The patient´s renal function (creatinine) is preserved. The patient has a normal anion gap.     Lactate 1.7  HIV1, HIV2 negative.  Treponema is reactive.     Will discharge the patient with Doxycycline. He will take this on top of the cefdinir to treat his cellulitis. This will cover MRSA and possible syphilis.    Follow up with your Primary Care Provider in 3-5 days especially if symptoms worsen.              Patient Care Considerations:    SEPSIS was considered but is NOT present in the emergency department as SIRS criteria is not present.      Consultants/Shared Management Plan:    None    Social Determinants of Health:    Patient is independent, reliable, and has access to care.       Disposition and Care Coordination:    Discharged: The patient is suitable and stable for discharge with no need for consideration of admission.    I have explained the patient´s condition, diagnoses and treatment plan based on the information available to me at this time. I have answered questions and addressed any concerns. The  patient has a good  understanding of the patient´s diagnosis, condition, and treatment plan as can be expected at this point. The vital signs have been stable. The patient´s condition is stable and appropriate for discharge from the emergency department.      The patient will pursue further outpatient evaluation with the primary care physician or other designated or consulting physician as outlined in the discharge instructions. They are agreeable to this plan of care and follow-up instructions have been explained in detail. The patient has received these instructions in written format and has expressed an understanding of the discharge instructions. The patient is aware that any significant change in condition or worsening of symptoms should prompt an immediate return to this or the closest emergency department or call to 911.  I have explained discharge medications and the need for follow up with the patient/caretakers. This was also printed in the discharge instructions. Patient was discharged with the following medications and follow up:      Medication List        New Prescriptions      doxycycline 100 MG capsule  Commonly known as: MONODOX  Take 1 capsule by mouth 2 (Two) Times a Day for 14 days.               Where to Get Your Medications        These medications were sent to [facility]/pharm[facility] #[id number] - [place] - [address] - [phone] [facility] [phone] FX  [address] [place] [address]      Hours: 24-hours Phone: [phone]   doxycycline 100 MG capsule      No follow-up provider specified.     Final diagnoses:   Cellulitis of left lower extremity   Syphilis        ED Disposition       ED Disposition   Discharge    Condition   Stable    Comment   --               This medical record created using voice recognition software.             Varghese Maza PA  04/17/25 8105

## 2025-04-17 NOTE — ED PROVIDER NOTES
Time: 1:54 AM EDT  Date of encounter:  4/16/2025  Independent Historian/Clinical History and Information was obtained by:   Patient    History is limited by: N/A    Chief Complaint: leg pain       History of Present Illness:  Patient is a 34 y.o. year old male who presents to the emergency department for evaluation of left leg pain started yesterday. Patient has a history of MRSA of the same leg and noticed increased in redness. Reports history of IVDU and most recent use was last year. Patient was seen at [place] ED for double ear infection and states that he notices the leg pain after taking his first dose of antibiotic. Reports fever but temp is 98.7. No other complaints at this time.       Patient Care Team  Primary Care Provider: Eliazar aMier MD    Past Medical History:     No Known Allergies  Past Medical History:   Diagnosis Date    Bipolar disorder      History reviewed. No pertinent surgical history.  History reviewed. No pertinent family history.    Home Medications:  Prior to Admission medications    Medication Sig Start Date End Date Taking? Authorizing Provider   Vraylar 3 MG capsule capsule Take 3 mg by mouth Daily. 4/13/22   ProviderFady MD        Social History:   Social History     Tobacco Use    Smoking status: Every Day     Current packs/day: 1.50     Average packs/day: 1.5 packs/day for 20.0 years (30.0 ttl pk-yrs)     Types: Cigarettes    Smokeless tobacco: Never   Vaping Use    Vaping status: Unknown   Substance Use Topics    Alcohol use: Not Currently    Drug use: Yes     Types: IV     Comment: LAST USED 3 DAYS         Review of Systems:  Review of Systems   Constitutional:  Positive for fever. Negative for chills.   HENT:  Negative for congestion and ear pain.    Eyes:  Negative for pain.   Respiratory:  Negative for cough and shortness of breath.    Cardiovascular:  Negative for chest pain.   Gastrointestinal:  Negative for abdominal pain, diarrhea, nausea and  "vomiting.   Genitourinary:  Negative for dysuria and hematuria.   Musculoskeletal:  Negative for myalgias.   Skin:  Negative for rash.   Neurological:  Negative for dizziness and headaches.        Physical Exam:  /74   Pulse 104   Temp 98.7 °F (37.1 °C) (Oral)   Resp 20   Ht 180.3 cm (71\")   Wt 95.6 kg (210 lb 12.2 oz)   SpO2 97%   BMI 29.40 kg/m²     Physical Exam  Vitals and nursing note reviewed.   Constitutional:       General: He is not in acute distress.     Appearance: Normal appearance.   HENT:      Head: Normocephalic and atraumatic.      Nose: Nose normal.      Mouth/Throat:      Mouth: Mucous membranes are moist.   Eyes:      Pupils: Pupils are equal, round, and reactive to light.   Cardiovascular:      Rate and Rhythm: Normal rate and regular rhythm.      Pulses: Normal pulses.      Heart sounds: Normal heart sounds.   Pulmonary:      Effort: Pulmonary effort is normal.      Breath sounds: Normal breath sounds.   Abdominal:      General: Abdomen is flat. Bowel sounds are normal.      Palpations: Abdomen is soft.   Musculoskeletal:         General: Normal range of motion.      Cervical back: Normal range of motion.      Right lower leg: No edema.      Left lower leg: No edema.      Comments: Right lower leg: redness, warmth and tenderness to palpation of anterior lower leg    Skin:     General: Skin is warm and dry.      Capillary Refill: Capillary refill takes less than 2 seconds.   Neurological:      General: No focal deficit present.      Mental Status: He is alert.   Psychiatric:         Mood and Affect: Mood normal.         Behavior: Behavior normal.        ***            Medical Decision Making:      Comorbidities that affect care:    {Comorbidities that affect care:87707}    External Notes reviewed:    {External Note review (Optional):19561}      The following orders were placed and all results were independently analyzed by me:  Orders Placed This Encounter   Procedures    Blood " Culture - Blood,    Chlamydia trachomatis, Neisseria gonorrhoeae, Trichomonas vaginalis, PCR - Urine, Urine, Random Void    Comprehensive Metabolic Panel    Lactic Acid, Plasma    CBC Auto Differential    Treponema pallidum AB w/Reflex RPR    HIV-1 & HIV-2 Antibodies    HIV-1 / O / 2 Ag / Antibody    RPR    CBC & Differential    Extra Tubes    Light Blue Top       Medications Given in the Emergency Department:  Medications - No data to display     ED Course:         Labs:    Lab Results (last 24 hours)       Procedure Component Value Units Date/Time    CBC & Differential [[id number]]  (Abnormal) Collected: 04/16/25 2239    Specimen: Blood from Arm, Right Updated: 04/16/25 2247    Narrative:      The following orders were created for panel order CBC & Differential.  Procedure                               Abnormality         Status                     ---------                               -----------         ------                     CBC Auto Differential[[id number]]        Abnormal            Final result                 Please view results for these tests on the individual orders.    Comprehensive Metabolic Panel [[id number]]  (Abnormal) Collected: 04/16/25 2239    Specimen: Blood from Arm, Right Updated: 04/16/25 2312     Glucose 131 mg/dL      BUN 9 mg/dL      Creatinine 1.05 mg/dL      Sodium 139 mmol/L      Potassium 3.9 mmol/L      Chloride 100 mmol/L      CO2 26.6 mmol/L      Calcium 9.3 mg/dL      Total Protein 7.5 g/dL      Albumin 4.0 g/dL      ALT (SGPT) 45 U/L      AST (SGOT) 32 U/L      Alkaline Phosphatase 93 U/L      Total Bilirubin 0.3 mg/dL      Globulin 3.5 gm/dL      A/G Ratio 1.1 g/dL      BUN/Creatinine Ratio 8.6     Anion Gap 12.4 mmol/L      eGFR 94.3 mL/min/1.73     Narrative:      GFR Categories in Chronic Kidney Disease (CKD)      GFR Category          GFR (mL/min/1.73)    Interpretation  G1                     90 or greater         Normal or high (1)  G2                      60-89                 Mild decrease (1)  G3a                   45-59                Mild to moderate decrease  G3b                   30-44                Moderate to severe decrease  G4                    15-29                Severe decrease  G5                    14 or less           Kidney failure          (1)In the absence of evidence of kidney disease, neither GFR category G1 or G2 fulfill the criteria for CKD.    eGFR calculation 2021 CKD-EPI creatinine equation, which does not include race as a factor    Lactic Acid, Plasma [[id number]]  (Normal) Collected: 04/16/25 2239    Specimen: Blood from Arm, Right Updated: 04/16/25 2310     Lactate 1.7 mmol/L     CBC Auto Differential [[id number]]  (Abnormal) Collected: 04/16/25 2239    Specimen: Blood from Arm, Right Updated: 04/16/25 2247     WBC 12.73 10*3/mm3      RBC 5.10 10*6/mm3      Hemoglobin 15.6 g/dL      Hematocrit 48.0 %      MCV 94.1 fL      MCH 30.6 pg      MCHC 32.5 g/dL      RDW 12.3 %      RDW-SD 42.8 fl      MPV 11.0 fL      Platelets 192 10*3/mm3      Neutrophil % 79.6 %      Lymphocyte % 13.7 %      Monocyte % 5.7 %      Eosinophil % 0.0 %      Basophil % 0.5 %      Immature Grans % 0.5 %      Neutrophils, Absolute 10.13 10*3/mm3      Lymphocytes, Absolute 1.74 10*3/mm3      Monocytes, Absolute 0.73 10*3/mm3      Eosinophils, Absolute 0.00 10*3/mm3      Basophils, Absolute 0.07 10*3/mm3      Immature Grans, Absolute 0.06 10*3/mm3      nRBC 0.0 /100 WBC     Blood Culture - Blood, Arm, Right [[id number]] Collected: 04/16/25 2239    Specimen: Blood from Arm, Right Updated: 04/16/25 2250    Treponema pallidum AB w/Reflex RPR [[id number]]  (Abnormal) Collected: 04/16/25 2241    Specimen: Blood from Arm, Right Updated: 04/16/25 2331     Treponemal AB Total Reactive    Narrative:      Reactive results will reflex RPR testing.    HIV-1 & HIV-2 Antibodies [[id number]]  (Normal) Collected: 04/16/25 2241    Specimen: Blood from Arm, Right Updated: 04/16/25 2330    Narrative:       The following orders were created for panel order HIV-1 & HIV-2 Antibodies.  Procedure                               Abnormality         Status                     ---------                               -----------         ------                     HIV-1 / O / 2 Ag / Antibody[[id number]]  Normal              Final result                 Please view results for these tests on the individual orders.    HIV-1 / O / 2 Ag / Antibody [[id number]]  (Normal) Collected: 04/16/25 2241    Specimen: Blood from Arm, Right Updated: 04/16/25 2330     HIV-1/ HIV-2 Ab Non-Reactive     HIV-1 P24 Ag Screen Non-Reactive    Narrative:      Detection of p24 may be inhibited by biotin in the sample, causing false negative results in acute infection. Therefore, do not test samples from patients who are taking biotin  Detection of p24 may be inhibited by biotin in the sample, causing false negative results in acute infection. Therefore, do not test samples from patients who are taking biotin    RPR [[id number]] Collected: 04/16/25 2241    Specimen: Blood from Arm, Right Updated: 04/16/25 2325             Imaging:    No Radiology Exams Resulted Within Past 24 Hours      Differential Diagnosis and Discussion:    {Differentials:43723}    PROCEDURES:    {Independent Review of (Optional):74938}    No orders to display       Procedures    MDM  Number of Diagnoses or Management Options           {CRITICAL CARE:81237}      {SEPSIS RECOGNITION:66607}    Patient Care Considerations:    {Considerations (Optional):24537}      Consultants/Shared Management Plan:    {Shared Management Plan (Optional):81894}    Social Determinants of Health:    {Social Determinants of Health (Optional):23883}      Disposition and Care Coordination:    {Admission consideration:99738}    {Discharge (Optional):24237}    Final diagnoses:   None        ED Disposition       None            This medical record created using voice recognition software.

## 2025-04-21 LAB — BACTERIA SPEC AEROBE CULT: NORMAL
